# Patient Record
Sex: MALE | Race: WHITE | Employment: OTHER | URBAN - METROPOLITAN AREA
[De-identification: names, ages, dates, MRNs, and addresses within clinical notes are randomized per-mention and may not be internally consistent; named-entity substitution may affect disease eponyms.]

---

## 2017-08-28 ENCOUNTER — OFFICE VISIT (OUTPATIENT)
Dept: INTERNAL MEDICINE | Facility: CLINIC | Age: 32
End: 2017-08-28
Payer: COMMERCIAL

## 2017-08-28 VITALS
HEART RATE: 74 BPM | HEIGHT: 75 IN | BODY MASS INDEX: 39.17 KG/M2 | TEMPERATURE: 98 F | DIASTOLIC BLOOD PRESSURE: 76 MMHG | WEIGHT: 315 LBS | OXYGEN SATURATION: 98 % | SYSTOLIC BLOOD PRESSURE: 112 MMHG

## 2017-08-28 DIAGNOSIS — F32.9 MAJOR DEPRESSIVE DISORDER WITH SINGLE EPISODE, REMISSION STATUS UNSPECIFIED: ICD-10-CM

## 2017-08-28 DIAGNOSIS — Z01.818 PREOP GENERAL PHYSICAL EXAM: Primary | ICD-10-CM

## 2017-08-28 DIAGNOSIS — M25.562 LEFT KNEE PAIN, UNSPECIFIED CHRONICITY: ICD-10-CM

## 2017-08-28 DIAGNOSIS — F32.9 MAJOR DEPRESSIVE DISORDER, SINGLE EPISODE, UNSPECIFIED: ICD-10-CM

## 2017-08-28 LAB
GLUCOSE SERPL-MCNC: 91 MG/DL (ref 70–99)
HGB BLD-MCNC: 15.9 G/DL (ref 13.3–17.7)
PLATELET # BLD AUTO: 230 10E9/L (ref 150–450)
POTASSIUM SERPL-SCNC: 4.3 MMOL/L (ref 3.4–5.3)

## 2017-08-28 PROCEDURE — 82947 ASSAY GLUCOSE BLOOD QUANT: CPT | Performed by: INTERNAL MEDICINE

## 2017-08-28 PROCEDURE — 36415 COLL VENOUS BLD VENIPUNCTURE: CPT | Performed by: INTERNAL MEDICINE

## 2017-08-28 PROCEDURE — 84132 ASSAY OF SERUM POTASSIUM: CPT | Performed by: INTERNAL MEDICINE

## 2017-08-28 PROCEDURE — 85049 AUTOMATED PLATELET COUNT: CPT | Performed by: INTERNAL MEDICINE

## 2017-08-28 PROCEDURE — 85018 HEMOGLOBIN: CPT | Performed by: INTERNAL MEDICINE

## 2017-08-28 PROCEDURE — 99215 OFFICE O/P EST HI 40 MIN: CPT | Performed by: INTERNAL MEDICINE

## 2017-08-28 RX ORDER — CITALOPRAM HYDROBROMIDE 20 MG/1
20 TABLET ORAL DAILY
Qty: 90 TABLET | Refills: 3 | Status: SHIPPED | OUTPATIENT
Start: 2017-08-28 | End: 2018-06-07

## 2017-08-28 ASSESSMENT — ANXIETY QUESTIONNAIRES
5. BEING SO RESTLESS THAT IT IS HARD TO SIT STILL: NOT AT ALL
1. FEELING NERVOUS, ANXIOUS, OR ON EDGE: NOT AT ALL
GAD7 TOTAL SCORE: 0
IF YOU CHECKED OFF ANY PROBLEMS ON THIS QUESTIONNAIRE, HOW DIFFICULT HAVE THESE PROBLEMS MADE IT FOR YOU TO DO YOUR WORK, TAKE CARE OF THINGS AT HOME, OR GET ALONG WITH OTHER PEOPLE: NOT DIFFICULT AT ALL
3. WORRYING TOO MUCH ABOUT DIFFERENT THINGS: NOT AT ALL
2. NOT BEING ABLE TO STOP OR CONTROL WORRYING: NOT AT ALL
6. BECOMING EASILY ANNOYED OR IRRITABLE: NOT AT ALL
7. FEELING AFRAID AS IF SOMETHING AWFUL MIGHT HAPPEN: NOT AT ALL

## 2017-08-28 ASSESSMENT — PATIENT HEALTH QUESTIONNAIRE - PHQ9
SUM OF ALL RESPONSES TO PHQ QUESTIONS 1-9: 0
5. POOR APPETITE OR OVEREATING: NOT AT ALL

## 2017-08-28 NOTE — MR AVS SNAPSHOT
After Visit Summary   8/28/2017    Aguilar Renteria    MRN: [id number]           Patient Information     Date Of Birth          1985        Visit Information        Provider Department      8/28/2017 11:40 AM Jefferson Yanes MD [facility]        Today's Diagnoses     Preop general physical exam    -  1    Left knee pain, unspecified chronicity        Major depressive disorder with single episode, remission status unspecified        Body mass index 38.0-38.9, adult        Major depressive disorder, single episode, unspecified          Care Instructions      Before Your Surgery      Call your surgeon if there is any change in your health. This includes signs of a cold or flu (such as a sore throat, runny nose, cough, rash or fever).    Do not smoke, drink alcohol or take over the counter medicine (unless your surgeon or primary care doctor tells you to) for the 24 hours before and after surgery.    If you take prescribed drugs: Follow your doctor s orders about which medicines to take and which to stop until after surgery.    Eating and drinking prior to surgery: follow the instructions from your surgeon    Take a shower or bath the night before surgery. Use the soap your surgeon gave you to gently clean your skin. If you do not have soap from your surgeon, use your regular soap. Do not shave or scrub the surgery site.  Wear clean pajamas and have clean sheets on your bed.           Follow-ups after your visit        Who to contact     If you have questions or need follow up information about today's clinic visit or your schedule please contact [facility] directly at [phone].  Normal or non-critical lab and imaging results will be communicated to you by MyChart, letter or phone within 4 business days after the clinic has received the results. If you do not hear from us within 7 days, please contact the clinic through MyChart or phone. If you have a  "critical or abnormal lab result, we will notify you by phone as soon as possible.  Submit refill requests through [employer] or call your pharmacy and they will forward the refill request to us. Please allow 3 business days for your refill to be completed.          Additional Information About Your Visit        Be[employer] Information     [employer] gives you secure access to your electronic health record. If you see a primary care provider, you can also send messages to your care team and make appointments. If you have questions, please call your primary care clinic.  If you do not have a primary care provider, please call [phone] and they will assist you.        Care EveryWhere ID     This is your Care EveryWhere ID. This could be used by other organizations to access your [facility] medical records  [id number]        Your Vitals Were     Pulse Temperature Height Pulse Oximetry BMI (Body Mass Index)       74 98  F (36.7  C) (Oral) 6' 3\" (1.905 m) 98% 41.25 kg/m2        Blood Pressure from Last 3 Encounters:   08/28/17 112/76   12/28/16 116/60   09/01/16 122/84    Weight from Last 3 Encounters:   08/28/17 (!) 330 lb (149.7 kg)   09/01/16 (!) 316 lb 1 oz (143.4 kg)   05/26/15 (!) 310 lb 4.8 oz (140.8 kg)              We Performed the Following     DEPRESSION ACTION PLAN (DAP)     Glucose     Hemoglobin     Platelet count     Potassium          Where to get your medicines      These medications were sent to [facility] [address] [address]     Phone:  [phone]     citalopram 20 MG tablet          Primary Care Provider Office Phone # Fax #    Jefferson Yanes [phone]       [address] [place] [phone]        Equal Access to Services     DARYN BOWDEN : Lazaro quiroso Sojignesh, waaxda luqadaha, qaybta kaalmada adeegyataiwo, shayna peterson. So [facility] [phone].    ATENCIÓN: Si habla " español, tiene a forrest disposición servicios gratuitos de asistencia lingüística. Michael vazquez [phone].    We comply with applicable federal civil rights laws and [place] laws. We do not discriminate on the basis of race, color, national origin, age, disability sex, sexual orientation or gender identity.            Thank you!     Thank you for choosing [facility]  for your care. Our goal is always to provide you with excellent care. Hearing back from our patients is one way we can continue to improve our services. Please take a few minutes to complete the written survey that you may receive in the mail after your visit with us. Thank you!             Your Updated Medication List - Protect others around you: Learn how to safely use, store and throw away your medicines at www.disposemymeds.org.          This list is accurate as of: 8/28/17 12:03 PM.  Always use your most recent med list.                   Brand Name Dispense Instructions for use Diagnosis    citalopram 20 MG tablet    celeXA    90 tablet    Take 1 tablet (20 mg) by mouth daily    Major depressive disorder, single episode, unspecified

## 2017-08-28 NOTE — PROGRESS NOTES
[facility]  [address] [address]  [phone]  Dept: [phone]    PRE-OP EVALUATION:  Today's date: 2017    Aguilar Renteria (: 1985) presents for pre-operative evaluation assessment as requested by Dr. Hunter.  He requires evaluation and anesthesia risk assessment prior to undergoing surgery/procedure for treatment of left knee.  Proposed procedure: left knee arthroscopy    Date of Surgery/ Procedure: 17  Time of Surgery/ Procedure: 1:30 pm  Hospital/Surgical Facility: [facility]  Fax number for surgical facility: [phone]  Primary Physician: Jefferson Yanes  Type of Anesthesia Anticipated: to be determined    Patient has a Health Care Directive or Living Will:  NO      HPI:                                                      Brief HPI related to upcoming procedure: treatment of left knee.  Proposed procedure: left knee arthroscopy      Aguilar Renteria is a 31 year old male here for preoperative assessment for treatment of left knee.  Proposed procedure: left knee arthroscopy      See problem list for active medical problems.  Problems all longstanding and stable, except as noted/documented.  See ROS for pertinent symptoms related to these conditions.                                                                                                  .    MEDICAL HISTORY:                                                      Patient Active Problem List    Diagnosis Date Noted     Chronic bilateral low back pain without sciatica 2016     Priority: Medium     CARDIOVASCULAR SCREENING; LDL GOAL LESS THAN 160 2016     Priority: Medium     Body mass index 38.0-38.9, adult 2014     Priority: Medium     Anxiety state 2014     Priority: Medium     Problem list name updated by automated process. Provider to review       Major depressive disorder, single episode 2014     Priority: Medium     Problem list name updated  "by automated process. Provider to review        History reviewed. No pertinent past medical history.  No past surgical history on file.  Current Outpatient Prescriptions   Medication Sig Dispense Refill     ondansetron (ZOFRAN ODT) 4 MG ODT tab Take 1 tablet (4 mg) by mouth every 6 hours as needed for nausea 15 tablet 0     citalopram (CELEXA) 20 MG tablet Take 1 tablet (20 mg) by mouth daily 90 tablet 3     OTC products: None, except as noted above    No Known Allergies   Latex Allergy: NO    Social History   Substance Use Topics     Smoking status: Never Smoker     Smokeless tobacco: Never Used     Alcohol use Yes      Comment: occas     History   Drug Use No       REVIEW OF SYSTEMS:                                                    C: NEGATIVE for fever, chills, change in weight  E/M: NEGATIVE for ear, mouth and throat problems  R: NEGATIVE for significant cough or SOB  CV: NEGATIVE for chest pain, palpitations or peripheral edema  GI: NEGATIVE for nausea, abdominal pain, heartburn, or change in bowel habits  : NEGATIVE for frequency, dysuria, or hematuria  M: NEGATIVE for significant arthralgias or myalgia  H: NEGATIVE for bleeding problems  P: NEGATIVE for changes in mood or affect    EXAM:                                                    /76  Pulse 74  Temp 98  F (36.7  C) (Oral)  Ht 6' 3\" (1.905 m)  Wt (!) 330 lb (149.7 kg)  SpO2 98%  BMI 41.25 kg/m2    GENERAL APPEARANCE: healthy, alert and no distress     EYES: EOMI,  PERRL     HENT: ear canals and TM's normal and nose and mouth without ulcers or lesions     NECK: no adenopathy, no asymmetry, masses, or scars and thyroid normal to palpation     RESP: lungs clear to auscultation - no rales, rhonchi or wheezes     CV: regular rates and rhythm, normal S1 S2, no S3 or S4 and no murmur, click or rub     ABDOMEN:  soft, nontender, no HSM or masses and bowel sounds normal     MS: extremities normal- no gross deformities noted     NEURO: No " changes     PSYCH: mentation appears normal. and affect normal/bright    DIAGNOSTICS:                                                    No  EKG required for low risk surgery (cataract, skin procedure, breast biopsy, etc)    Recent Labs   Lab Test  12/28/16   0715   HGB  16.3   PLT  201   NA  141   POTASSIUM  4.1   CR  1.10        IMPRESSION:                                                    Diagnosis/reason for consult: knee pain    The proposed surgical procedure is considered LOW risk.    REVISED CARDIAC RISK INDEX  The patient has the following serious cardiovascular risks for perioperative complications such as (MI, PE, VFib and 3  AV Block):  No serious cardiac risks  INTERPRETATION: 1 risks: Class II (low risk - 0.9% complication rate)    The patient has the following additional risks for perioperative complications:  No identified additional risks      ICD-10-CM    1. Preop general physical exam Z01.818 Potassium     Glucose     Hemoglobin     Platelet count   2. Left knee pain, unspecified chronicity M25.562    3. Major depressive disorder with single episode, remission status unspecified F32.9    4. Body mass index 38.0-38.9, adult Z68.38    5. Major depressive disorder, single episode, unspecified F32.9 citalopram (CELEXA) 20 MG tablet       RECOMMENDATIONS:                                                      --Consult hospital rounder / IM to assist post-op medical management    Cardiovascular Risk  Performs 4 METs exercise without symptoms (Light housework (dusting, washing dishes), Climb a flight of stairs and Walk on level ground at 15 minutes per mile (4 miles/hour)) .     --Patient is to take all scheduled medications on the day of surgery EXCEPT for modifications listed below.    APPROVAL GIVEN to proceed with proposed procedure, without further diagnostic evaluation     Stop all NSAIDS prior to procedure.    Signed Electronically by: Jefferson Yanes MD    Copy of this evaluation report is  provided to requesting physician, Dr. Dale Luke Preop Guidelines

## 2017-08-28 NOTE — NURSING NOTE
"Chief Complaint   Patient presents with     Pre-Op Exam       Initial /76  Pulse 74  Temp 98  F (36.7  C) (Oral)  Ht 6' 3\" (1.905 m)  Wt (!) 330 lb (149.7 kg)  SpO2 98%  BMI 41.25 kg/m2 Estimated body mass index is 41.25 kg/(m^2) as calculated from the following:    Height as of this encounter: 6' 3\" (1.905 m).    Weight as of this encounter: 330 lb (149.7 kg).  Medication Reconciliation: complete   Hilaria Washburn CMA      "

## 2017-08-28 NOTE — LETTER
My Depression Action Plan  Name: Aguilar Renteria   Date of Birth 1985  Date: 8/28/2017    My doctor: Jefferson Yanes   My clinic: [address] [address] [phone]          GREEN    ZONE   Good Control    What it looks like:     Things are going generally well. You have normal up s and down s. You may even feel depressed from time to time, but bad moods usually last less than a day.   What you need to do:  1. Continue to care for yourself (see self care plan)  2. Check your depression survival kit and update it as needed  3. Follow your physician s recommendations including any medication.  4. Do not stop taking medication unless you consult with your physician first.           YELLOW         ZONE Getting Worse    What it looks like:     Depression is starting to interfere with your life.     It may be hard to get out of bed; you may be starting to isolate yourself from others.    Symptoms of depression are starting to last most all day and this has happened for several days.     You may have suicidal thoughts but they are not constant.   What you need to do:     1. Call your care team, your response to treatment will improve if you keep your care team informed of your progress. Yellow periods are signs an adjustment may need to be made.     2. Continue your self-care, even if you have to fake it!    3. Talk to someone in your support network    4. Open up your depression survival kit           RED    ZONE Medical Alert - Get Help    What it looks like:     Depression is seriously interfering with your life.     You may experience these or other symptoms: You can t get out of bed most days, can t work or engage in other necessary activities, you have trouble taking care of basic hygiene, or basic responsibilities, thoughts of suicide or death that will not go away, self-injurious behavior.     What you need to do:  1. Call your care team  and request a same-day appointment. If they are not available (weekends or after hours) call your local crisis line, emergency room or 911.      Electronically signed by: Hilaria Washburn, August 28, 2017    Depression Self Care Plan / Survival Kit    Self-Care for Depression  Here s the deal. Your body and mind are really not as separate as most people think.  What you do and think affects how you feel and how you feel influences what you do and think. This means if you do things that people who feel good do, it will help you feel better.  Sometimes this is all it takes.  There is also a place for medication and therapy depending on how severe your depression is, so be sure to consult with your medical provider and/ or Behavioral Health Consultant if your symptoms are worsening or not improving.     In order to better manage my stress, I will:    Exercise  Get some form of exercise, every day. This will help reduce pain and release endorphins, the  feel good  chemicals in your brain. This is almost as good as taking antidepressants!  This is not the same as joining a gym and then never going! (they count on that by the way ) It can be as simple as just going for a walk or doing some gardening, anything that will get you moving.      Hygiene   Maintain good hygiene (Get out of bed in the morning, Make your bed, Brush your teeth, Take a shower, and Get dressed like you were going to work, even if you are unemployed).  If your clothes don't fit try to get ones that do.    Diet  I will strive to eat foods that are good for me, drink plenty of water, and avoid excessive sugar, caffeine, alcohol, and other mood-altering substances.  Some foods that are helpful in depression are: complex carbohydrates, B vitamins, flaxseed, fish or fish oil, fresh fruits and vegetables.    Psychotherapy  I agree to participate in Individual Therapy (if recommended).    Medication  If prescribed medications, I agree to take them.  Missing  doses can result in serious side effects.  I understand that drinking alcohol, or other illicit drug use, may cause potential side effects.  I will not stop my medication abruptly without first discussing it with my provider.    Staying Connected With Others  I will stay in touch with my friends, family members, and my primary care provider/team.    Use your imagination  Be creative.  We all have a creative side; it doesn t matter if it s oil painting, sand castles, or mud pies! This will also kick up the endorphins.    Witness Beauty  (AKA stop and smell the roses) Take a look outside, even in mid-winter. Notice colors, textures. Watch the squirrels and birds.     Service to others  Be of service to others.  There is always someone else in need.  By helping others we can  get out of ourselves  and remember the really important things.  This also provides opportunities for practicing all the other parts of the program.    Humor  Laugh and be silly!  Adjust your TV habits for less news and crime-drama and more comedy.    Control your stress  Try breathing deep, massage therapy, biofeedback, and meditation. Find time to relax each day.     My support system    Clinic Contact:  Phone number:    Contact 1:  Phone number:    Contact 2:  Phone number:    Rastafarian/:  Phone number:    Therapist:  Phone number:    Local crisis center:    Phone number:    Other community support:  Phone number:

## 2017-08-29 ASSESSMENT — ANXIETY QUESTIONNAIRES: GAD7 TOTAL SCORE: 0

## 2017-09-19 ENCOUNTER — VIRTUAL VISIT (OUTPATIENT)
Dept: FAMILY MEDICINE | Facility: OTHER | Age: 32
End: 2017-09-19

## 2017-09-19 NOTE — PROGRESS NOTES
"Date:   Clinician: Arya Aguilar  Clinician NPI: [id number]  Patient: Aguilar Renteria  Patient : 1985  Patient Address: [address] [place] [address], [place] [address]  Patient Phone: [phone]  Visit Protocol: URI  Patient Summary:  Aguilar is a 31 year old ( : 1985 ) male who initiated a Visit for cold, sinus infection, or influenza. When asked the question \"Please sign me up to receive news, health information and promotions from [employer].\", Aguilar responded \"No\".    Aguilar states his symptoms started gradually 3-6 days ago.   His symptoms consist of malaise, rhinitis, facial pain or pressure, a cough, a sore throat, myalgia, and nasal congestion.   Symptom Details     Nasal secretions: The color of his mucus is green.    Cough: Aguilar coughs every 5-10 minutes and his cough is more bothersome at night. Phlegm comes into his throat when he coughs. He believes the phlegm causes the cough. The color of the phlegm is green.     Sore throat: Aguilar reports having moderate throat pain, does not have exudate on his tonsils, and is able to swallow liquids. The lymph nodes in his neck are not enlarged. He states that rashes have not appeared on the skin since the sore throat started.     Facial pain or pressure: The facial pain or pressure feels worse when bending over or leaning forward.      Aguilar denies having fever, dyspnea, ear pain, headache, teeth pain, chills, enlarged lymph nodes, and wheezing. He also denies having recent facial or sinus surgery in the past 60 days, taking antibiotic medication for the symptoms, and double sickening.   Within the past week, Aguilar has not been exposed to someone with strep throat. He has not recently been exposed to someone with influenza. Aguilar has not been in close contact with any high risk individuals.   Weight: 330 lbs   Aguilar does not smoke or use smokeless tobacco.   MEDICATIONS:  Citalopram (Celexa)  , ALLERGIES:  NKDA   Clinician Response:  Dear Aguilar,  Based on " the information you have provided, you likely have viral sinusitis  commonly called a head cold.  I am prescribing fluticasone propionate nasal (Flonase) 50 mcg/spray. Take one or two inhalations in each nostril one time a day. There are no refills with this prescription.   Both viruses and bacteria cause sinus infections, but only bacterial infections will improve with antibiotics. Bacterial sinus infections usually do not develop until after you have been sick for 7 to 10 days. Viral infections, which are far more common, are often starting to improve on their own by that time.  Because you have had symptoms for less than 10 days, you likely have a viral infection, so antibiotics are not recommended. If you continue to have symptoms of sinus pain and pressure for more than 10 days, please consider doing another Visit.  You will feel better faster if you take care of yourself by getting more rest and drinking plenty of liquids, especially water.  Remember to wash your hands often and stay home while you are sick to decrease the chance you will spread your infection to others.  Try the following to help with your throat pain and discomfort:     Use throat lozenges    Gargle with warm salt water (1/4 teaspoon of salt per 8 ounce glass of water)    Suck on frozen items such as popsicles or ice cubes     Call 911 or go to the emergency room if you feel that your throat is closing off, you suddenly develop a rash, you are unable to swallow fluids, you are drooling, or you are having difficulty breathing.   Diagnosis: Viral sinusitis  Diagnosis ICD: J01.90  Prescription: fluticasone propionate (Flonase) 50mcg nasal spray 16 gm, 30 days supply. Take one or two inhalations in each nostril one time a day. Refills: 0, Refill as needed: no, Allow substitutions: yes  Pharmacy: [facility] - [phone] - [address] [address]

## 2017-10-12 ENCOUNTER — VIRTUAL VISIT (OUTPATIENT)
Dept: FAMILY MEDICINE | Facility: OTHER | Age: 32
End: 2017-10-12

## 2017-10-12 NOTE — PROGRESS NOTES
"Date:   Clinician: Beatrice Giron  Clinician NPI: [id number]  Patient: Aguilar Renteria  Patient : 1985  Patient Address: [address] [place] [address], [place] [address]  Patient Phone: [phone]  Visit Protocol: URI  Patient Summary:  Aguilar is a 31 year old ( : 1985 ) male who initiated a Visit for cold, sinus infection, or influenza. When asked the question \"Please sign me up to receive news, health information and promotions from [employer].\", Aguilar responded \"No\".    Aguilar states his symptoms started 1-2 days ago.   His symptoms consist of nasal congestion, malaise, myalgia, a sore throat, rhinitis, facial pain or pressure, a headache, a cough, and chills. Aguilar also feels feverish.   Symptom Details     Nasal secretions: The color of his mucus is clear.    Cough: Aguilar coughs every 5-10 minutes and his cough is not more bothersome at night. Phlegm comes into his throat when he coughs. He does not believe the phlegm causes the cough. The color of the phlegm is clear.     Sore throat: Aguilar reports having mild throat pain, does not have exudate on his tonsils, and is able to swallow liquids. The lymph nodes in his neck He is not sure if the lymph nodes in his neck are enlarged. He states that rashes have not appeared on the skin since the sore throat started.     Temperature: His current temperature is 100 degrees Fahrenheit.     Facial pain or pressure: The facial pain or pressure does not feel worse when bending or leaning forward.     Headache: He states the headache is moderate.      Aguilar denies having wheezing, dyspnea, ear pain, and teeth pain. He also denies taking antibiotic medication for the symptoms and having recent facial or sinus surgery in the past 60 days.   Aguilar is not sure if he has been exposed to someone with strep throat. He has not recently been exposed to someone with influenza. Aguilar has been in close contact with the following high risk individuals: children under the age " of 5.   Weight: 330 lbs   Aguilar does not smoke or use smokeless tobacco.   MEDICATIONS:  Citalopram (Celexa)  , ALLERGIES:  NKDA   Clinician Response:  Dear Aguilar,  Based on the information you have provided, you likely have a viral upper respiratory infection, otherwise known as a 'cold'.  I am prescribing benzonatate (Tessalon Perles) 100 mg to treat your cough. Take one to two tablets by mouth three times a day as needed for cough. There are no refills with this prescription.   Unless your are allergic to the over-the-counter medication(s) below, I recommend using:   Dextromethorphan (Robitussin DM or store brand). This medication is an expectorant that works by thinning the mucus in your air passages to make it easier to cough up the mucus and clear your airways. Adults and children over 12 years old, take two teaspoons by mouth every 4 hours as needed. This is an over-the-counter medication that does not require a prescription.   An antihistamine like Benadryl, Claritin or store brand. This is an over-the-counter medication that does not require a prescription.   A decongestant such as pseudoephedrine (Sudafed or store brand) to help your symptoms. This is an over-the-counter medication that does not require a prescription.   Acetaminophen (Tylenol), which helps to reduce your discomfort and fever. Take 1-2 pills every 4-6 hours. This is an over-the-counter medication that does not require a prescription.   Ibuprofen. Take 1-3 tablets (200-600mg) every 8 hours to help with the discomfort. Make sure to take the ibuprofen with food. Do not exceed 2400mg in 24 hours. This is an over-the-counter medication that does not require a prescription.   You will feel better faster if you take care of yourself by getting more rest and drinking plenty of liquids, especially water.  Remember to wash your hands often and stay home while you are sick to decrease the chance you will spread your infection to others.  Try the  following to help with your throat pain and discomfort:     Use throat lozenges    Gargle with warm salt water (1/4 teaspoon of salt per 8 ounce glass of water)    Suck on frozen items such as popsicles or ice cubes     Call 911 or go to the emergency room if you feel that your throat is closing off, you suddenly develop a rash, you are unable to swallow fluids, you are drooling, or you are having difficulty breathing.  Follow up with your primary care provider if your symptoms are not improving in 3-4 days.   Diagnosis: Viral URI  Diagnosis ICD: J06.9  Additional Clinician Notes: Please be seen in the clinic if your symptoms worsen or fail to improve in the next 7-10 days.  Prescription: benzonatate (Tessalon Perles) 100 mg oral capsule 30 capsules, 5 days supply. Take one to two capsules by mouth three times a day as needed. Refills: 0, Refill as needed: no, Allow substitutions: yes  Pharmacy: [facility] - [phone] - [address] [address]

## 2018-06-07 ENCOUNTER — OFFICE VISIT (OUTPATIENT)
Dept: INTERNAL MEDICINE | Facility: CLINIC | Age: 33
End: 2018-06-07
Payer: COMMERCIAL

## 2018-06-07 VITALS
HEIGHT: 75 IN | RESPIRATION RATE: 16 BRPM | DIASTOLIC BLOOD PRESSURE: 84 MMHG | WEIGHT: 315 LBS | SYSTOLIC BLOOD PRESSURE: 134 MMHG | HEART RATE: 81 BPM | TEMPERATURE: 98 F | BODY MASS INDEX: 39.17 KG/M2

## 2018-06-07 DIAGNOSIS — F32.9 MAJOR DEPRESSIVE DISORDER WITH SINGLE EPISODE, REMISSION STATUS UNSPECIFIED: Primary | ICD-10-CM

## 2018-06-07 LAB — TSH SERPL DL<=0.005 MIU/L-ACNC: 0.81 MU/L (ref 0.4–4)

## 2018-06-07 PROCEDURE — 99213 OFFICE O/P EST LOW 20 MIN: CPT | Performed by: INTERNAL MEDICINE

## 2018-06-07 PROCEDURE — 36415 COLL VENOUS BLD VENIPUNCTURE: CPT | Performed by: INTERNAL MEDICINE

## 2018-06-07 PROCEDURE — 84443 ASSAY THYROID STIM HORMONE: CPT | Performed by: INTERNAL MEDICINE

## 2018-06-07 RX ORDER — CETIRIZINE HYDROCHLORIDE 10 MG/1
10 TABLET ORAL DAILY
COMMUNITY

## 2018-06-07 ASSESSMENT — PAIN SCALES - GENERAL: PAINLEVEL: NO PAIN (0)

## 2018-06-07 NOTE — PROGRESS NOTES
SUBJECTIVE:   Aguilar Renteria is a 32 year old male who presents to clinic today for the following health issues:    Patient requesting thyroid check- has been doing weight watchers since April 2018 and has not had any weight loss. Patients mother has hx of thyroid problems. Patient d/c Citalopram around April and feels well off the medication.     Problem list and histories reviewed & adjusted, as indicated.  Additional history: as documented    Patient Active Problem List   Diagnosis     Anxiety state     Body mass index 38.0-38.9, adult     Chronic bilateral low back pain without sciatica     CARDIOVASCULAR SCREENING; LDL GOAL LESS THAN 160     Major depressive disorder with single episode, remission status unspecified     Past Surgical History:   Procedure Laterality Date     ORTHOPEDIC SURGERY  09/01/2017    Knee othroscopy       Social History   Substance Use Topics     Smoking status: Never Smoker     Smokeless tobacco: Never Used     Alcohol use Yes      Comment: occas     Family History   Problem Relation Age of Onset     Depression Mother      Thyroid Disease Mother      Obesity Mother      Anxiety Disorder Father      HEART DISEASE Maternal Grandfather      Breast Cancer Paternal Grandmother      Breast Cancer Other      Father's Sister         Current Outpatient Prescriptions   Medication Sig Dispense Refill     cetirizine (ZYRTEC) 10 MG tablet Take 10 mg by mouth daily       citalopram (CELEXA) 20 MG tablet Take 1 tablet (20 mg) by mouth daily 90 tablet 3     BP Readings from Last 3 Encounters:   06/07/18 134/84   08/28/17 112/76   12/28/16 116/60    Wt Readings from Last 3 Encounters:   06/07/18 (!) 341 lb 12.8 oz (155 kg)   08/28/17 (!) 330 lb (149.7 kg)   09/01/16 (!) 316 lb 1 oz (143.4 kg)           Reviewed and updated as needed this visit by clinical staff  Tobacco  Allergies  Meds  Med Hx  Surg Hx  Fam Hx  Soc Hx      Reviewed and updated as needed this visit by Provider      "  ROS:  CONSTITUTIONAL: NEGATIVE for fever, chills  ENT/MOUTH: NEGATIVE for ear, mouth and throat problems  RESP: NEGATIVE for significant cough or SOB  CV: NEGATIVE for chest pain, palpitations or peripheral edema  GI: NEGATIVE for nausea, abdominal pain, heartburn, or change in bowel habits  : NEGATIVE for frequency, dysuria, or hematuria  MUSCULOSKELETAL: NEGATIVE for significant arthralgias or myalgia  HEME: NEGATIVE for bleeding problems  PSYCHIATRIC: NEGATIVE for changes in mood or affect    OBJECTIVE:                                                    /84  Pulse 81  Temp 98  F (36.7  C) (Oral)  Resp 16  Ht 6' 3\" (1.905 m)  Wt (!) 341 lb 12.8 oz (155 kg)  BMI 42.72 kg/m2  Body mass index is 42.72 kg/(m^2).  GENERAL: alert and no distress  obese  PSYCH: Alert and oriented times 3; speech- coherent , normal rate and volume; able to articulate logical thoughts, able to abstract reason, no tangential thoughts, no hallucinations or delusions, affect- normal     ASSESSMENT/PLAN:                                                      (F32.9) Major depressive disorder with single episode, remission status unspecified  (primary encounter diagnosis)  Comment: Stable off therapy with PHQ 9 score of 0  Plan: TSH with free T4 reflex            (Z68.41) Body mass index (BMI) of 40.0-44.9 in adult (H)  Comment: Discussed a weight reduction in the setting of Weight Watchers point system, exercise and ideal body weight.  Plan: Vice patient to avoid excessive weight lifting due to risk of increasing muscle bulk and weight overall.  Exercise was emphasized.      See Patient Instructions    Jefferson Yanes MD  [facility]    THE MEDICATION LIST HAS BEEN FULLY RECONCILED BY THE M.D. AND THE NURSING STAFF.    "

## 2018-06-07 NOTE — LETTER
[facility]  [address] [address]  [phone]      6/8/2018       Aguilar Renteria  [address] [facility] [address]        Dear Aguilar,    Your thyroid function tests look good and thus I would not change anything at this point.    Sincerely,      Jefferson Yanes MD  Internal Medicine

## 2018-06-07 NOTE — MR AVS SNAPSHOT
After Visit Summary   6/7/2018    Aguilar Renteria    MRN: [id number]           Patient Information     Date Of Birth          1985        Visit Information        Provider Department      6/7/2018 2:20 PM Jefferson Yanes MD [facility]        Today's Diagnoses     Major depressive disorder with single episode, remission status unspecified    -  1    Body mass index (BMI) of 40.0-44.9 in adult (H)           Follow-ups after your visit        Follow-up notes from your care team     Return if symptoms worsen or fail to improve.      Who to contact     If you have questions or need follow up information about today's clinic visit or your schedule please contact [facility] directly at [phone].  Normal or non-critical lab and imaging results will be communicated to you by MyChart, letter or phone within 4 business days after the clinic has received the results. If you do not hear from us within 7 days, please contact the clinic through [email or web address] or phone. If you have a critical or abnormal lab result, we will notify you by phone as soon as possible.  Submit refill requests through [employer] or call your pharmacy and they will forward the refill request to us. Please allow 3 business days for your refill to be completed.          Additional Information About Your Visit        MyChart Information     [employer] gives you secure access to your electronic health record. If you see a primary care provider, you can also send messages to your care team and make appointments. If you have questions, please call your primary care clinic.  If you do not have a primary care provider, please call [phone] and they will assist you.        Care EveryWhere ID     This is your Care EveryWhere ID. This could be used by other organizations to access your [place] medical records  [id number]        Your Vitals Were     Pulse Temperature Respirations Height BMI (Body Mass  "Index)       81 98  F (36.7  C) (Oral) 16 6' 3\" (1.905 m) 42.72 kg/m2        Blood Pressure from Last 3 Encounters:   06/07/18 134/84   08/28/17 112/76   12/28/16 116/60    Weight from Last 3 Encounters:   06/07/18 (!) 341 lb 12.8 oz (155 kg)   08/28/17 (!) 330 lb (149.7 kg)   09/01/16 (!) 316 lb 1 oz (143.4 kg)              We Performed the Following     TSH with free T4 reflex        Primary Care Provider Office Phone # Fax #    Jefferson Yanes [phone]       [address] [facility] [address]        Equal Access to Services     DARYN BOWDEN : Hadii lilo conway hadasho Soomaali, waaxda luqadaha, qaybta kaalmada adeegyada, shayna liu haylida king . So [facility] [phone].    ATENCIÓN: Si habla español, tiene a forrest disposición servicios gratuitos de asistencia lingüística. Llame al [phone].    We comply with applicable federal civil rights laws and [place] laws. We do not discriminate on the basis of race, color, national origin, age, disability, sex, sexual orientation, or gender identity.            Thank you!     Thank you for choosing [facility]  for your care. Our goal is always to provide you with excellent care. Hearing back from our patients is one way we can continue to improve our services. Please take a few minutes to complete the written survey that you may receive in the mail after your visit with us. Thank you!             Your Updated Medication List - Protect others around you: Learn how to safely use, store and throw away your medicines at www.disposemymeds.org.          This list is accurate as of 6/7/18  2:39 PM.  Always use your most recent med list.                   Brand Name Dispense Instructions for use Diagnosis    cetirizine 10 MG tablet    zyrTEC     Take 10 mg by mouth daily          "

## 2018-06-08 ASSESSMENT — PATIENT HEALTH QUESTIONNAIRE - PHQ9: SUM OF ALL RESPONSES TO PHQ QUESTIONS 1-9: 0

## 2018-07-31 ENCOUNTER — HOSPITAL ENCOUNTER (OUTPATIENT)
Dept: LAB | Facility: CLINIC | Age: 33
Discharge: HOME | End: 2018-07-31
Attending: PHYSICIAN ASSISTANT | Admitting: PHYSICIAN ASSISTANT
Payer: COMMERCIAL

## 2018-07-31 ENCOUNTER — OFFICE VISIT (OUTPATIENT)
Dept: SURGERY | Facility: CLINIC | Age: 33
End: 2018-07-31
Payer: COMMERCIAL

## 2018-07-31 VITALS
SYSTOLIC BLOOD PRESSURE: 128 MMHG | HEART RATE: 82 BPM | RESPIRATION RATE: 12 BRPM | HEIGHT: 75 IN | TEMPERATURE: 98 F | BODY MASS INDEX: 39.17 KG/M2 | OXYGEN SATURATION: 97 % | WEIGHT: 315 LBS | DIASTOLIC BLOOD PRESSURE: 83 MMHG

## 2018-07-31 VITALS — BODY MASS INDEX: 39.17 KG/M2 | HEIGHT: 75 IN | WEIGHT: 315 LBS

## 2018-07-31 DIAGNOSIS — Z13.29 SCREENING FOR ENDOCRINE, NUTRITIONAL, METABOLIC AND IMMUNITY DISORDER: ICD-10-CM

## 2018-07-31 DIAGNOSIS — Z13.228 SCREENING FOR ENDOCRINE, NUTRITIONAL, METABOLIC AND IMMUNITY DISORDER: ICD-10-CM

## 2018-07-31 DIAGNOSIS — E66.01 MORBID OBESITY (H): ICD-10-CM

## 2018-07-31 DIAGNOSIS — E66.01 MORBID OBESITY (H): Primary | ICD-10-CM

## 2018-07-31 DIAGNOSIS — Z13.21 SCREENING FOR ENDOCRINE, NUTRITIONAL, METABOLIC AND IMMUNITY DISORDER: ICD-10-CM

## 2018-07-31 DIAGNOSIS — G47.33 OSA ON CPAP: ICD-10-CM

## 2018-07-31 DIAGNOSIS — Z13.0 SCREENING FOR ENDOCRINE, NUTRITIONAL, METABOLIC AND IMMUNITY DISORDER: ICD-10-CM

## 2018-07-31 DIAGNOSIS — Z13.0 SCREENING FOR IRON DEFICIENCY ANEMIA: ICD-10-CM

## 2018-07-31 DIAGNOSIS — R73.03 PREDIABETES: ICD-10-CM

## 2018-07-31 DIAGNOSIS — G89.29 CHRONIC BILATERAL LOW BACK PAIN WITHOUT SCIATICA: ICD-10-CM

## 2018-07-31 DIAGNOSIS — M54.50 CHRONIC BILATERAL LOW BACK PAIN WITHOUT SCIATICA: ICD-10-CM

## 2018-07-31 LAB
ALBUMIN SERPL-MCNC: 3.6 G/DL (ref 3.4–5)
ALP SERPL-CCNC: 74 U/L (ref 40–150)
ALT SERPL W P-5'-P-CCNC: 44 U/L (ref 0–70)
ANION GAP SERPL CALCULATED.3IONS-SCNC: 4 MMOL/L (ref 3–14)
AST SERPL W P-5'-P-CCNC: 25 U/L (ref 0–45)
BILIRUB SERPL-MCNC: 0.9 MG/DL (ref 0.2–1.3)
BUN SERPL-MCNC: 14 MG/DL (ref 7–30)
CALCIUM SERPL-MCNC: 9.1 MG/DL (ref 8.5–10.1)
CHLORIDE SERPL-SCNC: 107 MMOL/L (ref 94–109)
CO2 SERPL-SCNC: 31 MMOL/L (ref 20–32)
CREAT SERPL-MCNC: 1.36 MG/DL (ref 0.66–1.25)
DEPRECATED CALCIDIOL+CALCIFEROL SERPL-MC: 29 UG/L (ref 20–75)
ERYTHROCYTE [DISTWIDTH] IN BLOOD BY AUTOMATED COUNT: 12.7 % (ref 10–15)
GFR SERPL CREATININE-BSD FRML MDRD: 61 ML/MIN/1.7M2
GLUCOSE SERPL-MCNC: 98 MG/DL (ref 70–99)
HBA1C MFR BLD: 5.8 % (ref 0–5.6)
HCT VFR BLD AUTO: 45.9 % (ref 40–53)
HGB BLD-MCNC: 15.5 G/DL (ref 13.3–17.7)
MCH RBC QN AUTO: 30.3 PG (ref 26.5–33)
MCHC RBC AUTO-ENTMCNC: 33.8 G/DL (ref 31.5–36.5)
MCV RBC AUTO: 90 FL (ref 78–100)
PLATELET # BLD AUTO: 208 10E9/L (ref 150–450)
POTASSIUM SERPL-SCNC: 3.7 MMOL/L (ref 3.4–5.3)
PROT SERPL-MCNC: 7.4 G/DL (ref 6.8–8.8)
RBC # BLD AUTO: 5.11 10E12/L (ref 4.4–5.9)
SODIUM SERPL-SCNC: 142 MMOL/L (ref 133–144)
WBC # BLD AUTO: 8.4 10E9/L (ref 4–11)

## 2018-07-31 PROCEDURE — 36415 COLL VENOUS BLD VENIPUNCTURE: CPT | Performed by: PHYSICIAN ASSISTANT

## 2018-07-31 PROCEDURE — 85027 COMPLETE CBC AUTOMATED: CPT | Performed by: PHYSICIAN ASSISTANT

## 2018-07-31 PROCEDURE — 80053 COMPREHEN METABOLIC PANEL: CPT | Performed by: PHYSICIAN ASSISTANT

## 2018-07-31 PROCEDURE — 82306 VITAMIN D 25 HYDROXY: CPT | Performed by: PHYSICIAN ASSISTANT

## 2018-07-31 PROCEDURE — 83036 HEMOGLOBIN GLYCOSYLATED A1C: CPT | Performed by: PHYSICIAN ASSISTANT

## 2018-07-31 PROCEDURE — 97802 MEDICAL NUTRITION INDIV IN: CPT | Performed by: DIETITIAN, REGISTERED

## 2018-07-31 PROCEDURE — 99205 OFFICE O/P NEW HI 60 MIN: CPT | Performed by: PHYSICIAN ASSISTANT

## 2018-07-31 NOTE — PROGRESS NOTES
"2018      New Bariatric Surgery Consultation Note    RE: Aguilar Renteria  MR#: [id number]  : 1985    Chief Complaint/Reason for visit: evaluation for possible weight loss surgery    Dear Jefferson Yanes MD (General),    I had the pleasure of seeing your patient, Aguilar Renteria, to evaluate his obesity and consider him for possible weight loss surgery. As you know, Aguilar Renteria is 32 year old.  He has a height of 6' 3\", a weight of 345 lbs 0 oz, and calculated Body mass index is 43.12 kg/(m^2).     HISTORY OF PRESENT ILLNESS:  Weight Loss History Reviewed with Patient 2018   How long have you been overweight? Since puberty   What is the most that you have ever weighed? 350   What is the most weight you have lost? 60   I have tried the following methods to lose weight Watching portions or calories, Exercise, Weight Watchers   I have tried the following weight loss medications? (Check all that apply) None   Have you ever had weight loss surgery? No       CO-MORBIDITIES OF OBESITY INCLUDE:     2018   I have the following co-morbidities associated with obesity: Sleep Apnea, Weight Bearing Joint Pain- low back pain   Do you use a CPAP? Yes       PAST MEDICAL HISTORY:  Past Medical History:   Diagnosis Date     Anxiety May 2014    Took Citalapram for anxiety, no longer on medication.     Major depressive disorder with single episode, remission status unspecified        PAST SURGICAL HISTORY: No previous bleeding, anesthesia, or nausea concerns with surgery.  Past Surgical History:   Procedure Laterality Date     ENT SURGERY      TM repair     ORTHOPEDIC SURGERY  2017    Knee othroscopy       FAMILY HISTORY:   Family History   Problem Relation Age of Onset     Depression Mother      Thyroid Disease Mother      Obesity- s/p lapband Mother      Anxiety Disorder Father      HEART DISEASE Maternal Grandfather      Breast Cancer Paternal Grandmother      Breast Cancer Other      Father's Sister "       SOCIAL HISTORY:   Social History Questions Reviewed With Patient 7/31/2018   Which best describes your employment status (select all that apply) I work full-time   If you work, what is your occupation?  for [employer]   Which best describes your marital status: -wife Esperanza   Do you have children? No   Who do you have in your support network that can be available to help you for the first 2 weeks after surgery? wife, parents   Who can you count on for support throughout your weight loss surgery journey? wife   Can you afford 3 meals a day?  Yes   Can you afford 50-60 dollars a month for vitamins? Yes   Enjoys travel. Recently returned from [facility].      HABITS:     7/31/2018   How often do you drink alcohol? 2-3 times a week   If you do drink alcohol, how many drinks might you have in a day? (one drink = 5 oz. wine, 1 can/bottle of beer, 1 shot liquor) 1 or 2   Do you currently use any of the following Nicotine products? No   Have you ever used any of the following nicotine products? No   Have you or are you currently using street drugs or prescription strength medication for which you do not have a prescription for? No   Do you have a history of chemical dependency (alcohol or drug abuse)? No       PSYCHOLOGICAL HISTORY:   Psychological History Reviewed With Patient 7/31/2018   Have you ever attempted suicide? Never.   Have you had thoughts of suicide in the past year? No   Have you ever been hospitalized for mental illness or a suicide attempt? Never.   Do you have a history of chronic pain? No   Have you ever been diagnosed with fibromyalgia? No   Are you currently being treated for any of the following? (select all that apply) I do not have a mental illness       ROS:     7/31/2018   Skin:  None of the above   HEENT: Dizziness upon standing occasionally   Musculoskeletal: Joint Pain, Back pain   Cardiovascular: Shortness of breath with activity   Pulmonary: Shortness of  "breath with activity, Snoring   Gastrointestinal: None of the above   Genitourinary: None of the above   Hematological: None of the above   Neurological: None of the above       EATING BEHAVIORS:     7/31/2018   Have you or anyone else thought that you had an eating disorder? No   Do you currently binge eat (eat a large amount of food in a short time)? No   Are you an emotional eater? Yes   Do you get up to eat after falling asleep? No       EXERCISE:     7/31/2018   How often do you exercise? Less than 1 time per week   What is the duration of your exercise (in minutes)? 30 Minutes   What types of exercise do you do? walking, gym membership, weightlifting   What keeps you from being more active?  Pain, I have just had surgery on one or more of my joints, Shortness of breath, Lack of Time, Too tired       MEDICATIONS:  Current Outpatient Prescriptions   Medication     cetirizine (ZYRTEC) 10 MG tablet     No current facility-administered medications for this visit.        ALLERGIES:  No Known Allergies    LABS/IMAGING/MEDICAL RECORDS REVIEW:   TSH   Date Value Ref Range Status   06/07/2018 0.81 0.40 - 4.00 mU/L Final       PHYSICAL EXAM:  /83 (BP Location: Right arm, Patient Position: Sitting, Cuff Size: Adult Large)  Pulse 82  Temp 98  F (36.7  C) (Oral)  Resp 12  Ht 6' 3\" (1.905 m)  Wt (!) 345 lb (156.5 kg)  SpO2 97%  BMI 43.12 kg/m2  GENERAL:  Good development and normal affect in no acute distress. Patient is alert and orientated x 3 and answers all questions appropriately.  HEENT: Head is normocephalic, nontraumatic. Pupils equal and round without conjunctival injection. Neck is supple without lymphadenopathy, thyroidmegaly, or mass. Trachea midline. Dentition WNL.   CARDIOVASCULAR:  Regular rate and rhythm without murmurs, rubs, or gallops.  RESPIRATORY: Lungs are clear to auscultation bilaterally with good breath sounds.  GASTROINTESTINAL: Abdomen is obese, nondistended, soft, nontender, without " organomegaly or masses. No bruits.  LOWER EXTREMITIES: No LE edema bilaterally, no cyanosis, ulceration, or chronic venous stasis noted.  MUSCULOSKELETAL:  Moves all 4 extremities equal and strong. Has a normal gait.   NEUROLOGIC: Cranial nerves II-XII grossly intact.  SKIN: No intertriginous irritation or rash.     In summary, Aguilar Renteria has Class III obesity with a body mass index of Body mass index is 43.12 kg/(m^2). kg/m2 and the comorbidities stated above. He completed an informational seminar and is a candidate for the laparoscopic gastric sleeve.  He will have to complete the following pre-requisites:    Lose 5 lbs prior to surgery.  Goal Weight: 340 lbs  Have preoperative laboratory tests drawn.   Psychological Evaluation with MMPI and clearance for weight loss surgery.  Achieve clearance from dietitian to see surgeon.  Today in the office we discussed gastric bypass and gastric sleeve surgery. Preoperative, perioperative, and postoperative processes, management, and follow up were addressed.  Risks and benefits were outlined including the risk of death, staple line leak (1-2%), PE, DVT, ulcer, worsening GERD with gastric sleeve surgery, N/V, stricture, hernia, wound infection, weight regain, and vitamin deficiencies. I emphasized exercise and activity along with appropriate food choice as the main foundation for weight loss with surgery providing surgical reinforcement of this. Instruction provided on the importance of avoiding NSAIDS and its impact on ulcer formation and/or perforation following bariatric surgery.   All questions were answered.  A goal sheet and support group handout were given to the patient.     Once the patient has completed the requirements in their task list and there are no further recommendations, the pt will be allowed to see the surgeon of her choice for consultation on the laparoscopic gastric sleeve or gastric bypass surgery. Patient verbalizes understanding of the process to  surgery and expectations for the postoperative period including the need for lifelong lifestyle changes, vitamin supplementation, and laboratory monitoring.     Sincerely,    Daniela Hightower PA-C    I spent a total of 70 minutes face to face with Aguilar during today's office visit. Over 50% of this time was spent counseling the patient and/or coordinating care.

## 2018-07-31 NOTE — PROGRESS NOTES
"New Bariatric Nutrition Consultation Note    Reason For Visit: Nutrition Assessment    Aguilar Renteria is a 32 year old presenting today for new bariatric nutrition consult.  Pt is interested in laparoscopic (undecided) Patient is accompanied by wife.    Support System Reviewed With Patient 7/31/2018   Who do you have in your support network that can be available to help you for the first 2 weeks after surgery? wife, parents   Who can you count on for support throughout your weight loss surgery journey? wife       ANTHROPOMETRICS:  Estimated body mass index is 43.12 kg/(m^2) as calculated from the following:    Height as of this encounter: 1.905 m (6' 3\").    Weight as of this encounter: 156.5 kg (345 lb).    Required weight loss goal pre-op: 5 lbs from initial consult weight (goal weight 340 lbs or less before surgery)       7/31/2018   I have tried the following methods to lose weight Watching portions or calories, Exercise, Weight Watchers       Weight Loss Questions Reviewed With Patient 7/31/2018   How long have you been overweight? Since puberty         NUTRITION HISTORY:  Recall Diet Questions Reviewed With Patient 7/31/2018   Describe what you typically consume for breakfast (typical or most recent): eggs, coffee, sausage, toast   Describe what you typically consume for lunch (typical or most recent): frozen lean cuisine   Describe what you typically consume for supper (typical or most recent): chicken and rice, or burgers, veggies   Describe what you typically consume as snacks (typical or most recent): fruit, nuts, juice, chips, soda,protien bars, hummus, veggies, crackers, popcorn, black olives   How many ounces of water, or other low calorie drinks, do you drink daily (8 oz=1 glass)? 32 oz   How many ounces of caffeine (coffee, tea, pop) do you drink daily (8 oz=1 glass)? 16 oz   How many ounces of carbonated (pop, beer, sparkling water) drinks do you drinky daily (8 oz=1 glass)? 8 oz   How many ounces of " juice, pop, sweet tea, sports drinks, protein drinks, other sweetened drinks, do you drink daily (8 oz=1 glass)? 24 oz   How many ounces of milk do you drink daily (8 oz=1 glass) 8 oz   Please indicate the type of milk: 2%   How often do you drink alcohol? 2-3 times a week   If you do drink alcohol, how many drinks might you have in a day? (one drink = 5 oz. wine, 1 can/bottle of beer, 1 shot liquor) 1 or 2       Eating Habits 7/31/2018   Do you have any dietary restrictions? Yes   Do you currently binge eat (eat a large amount of food in a short time)? No   Are you an emotional eater? Yes   Do you get up to eat after falling asleep? No   What foods do you crave? pizza, burgers, pasta, fast food, steak, mexican food, ice cream, soda, sweet tea, deep fried and breaded foods       ADDITIONAL INFORMATION:  Pt has been thinking about surgery for ~ 6 months. Mother had lap band but regained weight once removed. Wife's mother, sister and 2 friends have had gastric bypass with good results.     Dining Out History Reviewed With Patient 7/31/2018   How often do you dine out? Around once a week.   Where do you dine out? (select all that apply) sit-down restaurants, fast food chains, take out   What types of food do you order when you dine out? burgers, breakfast eggs, pancakes, sausage       Physical Activity Reviewed With Patient 7/31/2018   How often do you exercise? Less than 1 time per week   What is the duration of your exercise (in minutes)? 30 Minutes   What types of exercise do you do? walking, gym membership, weightlifting   What keeps you from being more active?  Pain, I have just had surgery on one or more of my joints, Shortness of breath, Lack of Time, Too tired       NUTRITION DIAGNOSIS:  Obesity r/t long history of self-monitoring deficit and excessive energy intake aeb BMI >30 kg/m2.    INTERVENTION:  Intervention Provided/Education Provided on post-op diet guidelines, vitamins/minerals essential  post-operatively, GI anatomy of bariatric surgeries, ways to help prepare for post-op diet guidelines pre-operatively, portion/calorie-control, mindful eating.  Provided pt with list of goals RD contact information.      Questions Reviewed With Patient 7/31/2018   How ready are you to make changes regarding your weight? Number 1 = Not ready at all to make changes up to 10 = very ready. 10   How confident are you that you can change? 1 = Not confident that you will be successful making changes up to 10 = very confident. 8       Patient Understanding: good  Expected Compliance: good    GOALS:  Begin taking multivitamin, calcium and Vitamin D per guidelines  Eliminate carbonated beverages  Limit restaurants/fast food to 1x/week    Follow-Up:   Recommend 2-3 follow up visits to assist with lifestyle changes or per insurance.      Time spent with patient: 60 minutes.    Raina Arroyo RD, LD  Clinical Dietitian

## 2018-07-31 NOTE — LETTER
Aguilar Renteria  July 31, 2018        Bariatric Task List      Required Weight loss:    Lose 5 lbs prior to surgery.  Goal Weight: 340 lbs  Tasks:  Have preoperative laboratory tests drawn.   Psychological Evaluation with MMPI and clearance for weight loss surgery.  Achieve clearance from dietitian to see surgeon.

## 2018-07-31 NOTE — Clinical Note
I had the pleasure of seeing Aguilar in our bariatric clinic to evaluate his obesity. He is considering baraitric surgery and had an initial evaluation today. I will keep you abreast of his progress.  Sincerely,   Daniela Hightower PA-C

## 2018-07-31 NOTE — MR AVS SNAPSHOT
After Visit Summary   7/31/2018    Aguilar Renteria    MRN: [id number]           Patient Information     Date Of Birth          1985        Visit Information        Provider Department      7/31/2018 11:00 AM Daniela Hightower PA-C [facility]      Today's Diagnoses     Morbid obesity (H)    -  1    LIZANDRO on CPAP        Screening for iron deficiency anemia        Screening for endocrine, nutritional, metabolic and immunity disorder          Care Instructions    Please refer to your task list created today at your appointment.  Make appointment to return to clinic in 1 month to see the dietician.                    Follow-ups after your visit        Additional Services     NUTRITION REFERRAL       Type of nutrition instruction needed: Weight Loss  Your provider has referred you to:     [facility]                  Follow-up notes from your care team     Return in 1 month (on 8/31/2018).      Your next 10 appointments already scheduled     Sep 26, 2018 10:00 AM CDT   (Arrive by 9:30 AM)   New Visit with Paola Shipman [facility]    [address] [place] [address]   [phone]            Oct 03, 2018  9:00 AM CDT   Return Visit with Paola Shipman [facility]    [address] [place] [address]   [phone]            Oct 24, 2018  9:00 AM CDT   Return Visit with Paola Shipman Bruna   [facility] [place] [facility] (Levine, Susan. \[facility].\""[address] [address]   [phone]              Future tests that were ordered for you today     Open Future Orders        Priority Expected Expires Ordered    CBC with platelets Routine 7/31/2018 1/27/2019 7/31/2018    Comprehensive metabolic panel Routine  "7/31/2018 1/27/2019 7/31/2018    Hemoglobin A1c Routine 7/31/2018 1/27/2019 7/31/2018    Vitamin D Deficiency Routine 7/31/2018 1/27/2019 7/31/2018            Who to contact     If you have questions or need follow up information about today's clinic visit or your schedule please contact [facility] directly at [phone].  Normal or non-critical lab and imaging results will be communicated to you by [employer], letter or phone within 4 business days after the clinic has received the results. If you do not hear from us within 7 days, please contact the clinic through [employer] or phone. If you have a critical or abnormal lab result, we will notify you by phone as soon as possible.  Submit refill requests through [employer] or call your pharmacy and they will forward the refill request to us. Please allow 3 business days for your refill to be completed.          Additional Information About Your Visit        [employer] Information     [employer] gives you secure access to your electronic health record. If you see a primary care provider, you can also send messages to your care team and make appointments. If you have questions, please call your primary care clinic.  If you do not have a primary care provider, please call [phone] and they will assist you.        Care EveryWhere ID     This is your Care EveryWhere ID. This could be used by other organizations to access your [facility] medical records  [id number]        Your Vitals Were     Pulse Temperature Respirations Height Pulse Oximetry BMI (Body Mass Index)    82 98  F (36.7  C) (Oral) 12 6' 3\" (1.905 m) 97% 43.12 kg/m2       Blood Pressure from Last 3 Encounters:   07/31/18 128/83   06/07/18 134/84   08/28/17 112/76    Weight from Last 3 Encounters:   07/31/18 (!) 345 lb (156.5 kg)   07/31/18 (!) 345 lb (156.5 kg)   06/07/18 (!) 341 lb 12.8 oz (155 kg)              We Performed the Following     NUTRITION REFERRAL        Primary Care " Provider Office Phone # Fax #    Jefferson Yanes [phone]       [address] [place] [phone]        Equal Access to Services     DARYN BOWDEN : Lazaro lilo conway chulao Soarturoali, waaxda luqadaha, qaybta kaalmada adeflorence, shayna mejia laForeignlida kristen. So [facility] [phone].    ATENCIÓN: Si habla español, tiene a forrest disposición servicios gratuitos de asistencia lingüística. Llame al [phone].    We comply with applicable federal civil rights laws and [place] laws. We do not discriminate on the basis of race, color, national origin, age, disability, sex, sexual orientation, or gender identity.            Thank you!     Thank you for choosing [facility]  for your care. Our goal is always to provide you with excellent care. Hearing back from our patients is one way we can continue to improve our services. Please take a few minutes to complete the written survey that you may receive in the mail after your visit with us. Thank you!             Your Updated Medication List - Protect others around you: Learn how to safely use, store and throw away your medicines at www.disposemymeds.org.          This list is accurate as of 7/31/18 12:25 PM.  Always use your most recent med list.                   Brand Name Dispense Instructions for use Diagnosis    cetirizine 10 MG tablet    zyrTEC     Take 10 mg by mouth daily

## 2018-07-31 NOTE — MR AVS SNAPSHOT
MRN:[id number]                      After Visit Summary   7/31/2018    Aguilar Renteria    MRN: [id number]           Visit Information        Provider Department      7/31/2018 10:00 AM 3, Sh Jenna Deluca RD [facility]      Your next 10 appointments already scheduled     Sep 26, 2018 10:00 AM CDT   (Arrive by 9:30 AM)   New Visit with Paola Shipman [facility] [place] ([facility])    [address] [place] [address]   [phone]            Oct 03, 2018  9:00 AM CDT   Return Visit with Paola Shipman [facility] ([facility])    [address] [place] [address]   [phone]            Oct 24, 2018  9:00 AM CDT   Return Visit with Paola Shipman PsyD   [facility] ([facility])    [address] [place] [address]   [phone]              MyChart Information     [employer] gives you secure access to your electronic health record. If you see a primary care provider, you can also send messages to your care team and make appointments. If you have questions, please call your primary care clinic.  If you do not have a primary care provider, please call [phone] and they will assist you.        Care EveryWhere ID     This is your Care EveryWhere ID. This could be used by other organizations to access your [facility] medical records  [id number]        Equal Access to Services     DARYN BOWDEN : Hadii lilo conway hadasho Soomaali, waaxda luqadaha, qaybta kaalmada ada, shayna peterson. So [facility] [phone].    ATENCIÓN: Si habla español, tiene a forrest disposición servicios gratuitos de asistencia lingüística. Llame al [phone].    We comply with applicable federal civil rights laws and [place] laws. We do not discriminate on the basis  of race, color, national origin, age, disability, sex, sexual orientation, or gender identity.

## 2018-07-31 NOTE — PATIENT INSTRUCTIONS
Please refer to your task list created today at your appointment.  Make appointment to return to clinic in 1 month to see the dietician.

## 2018-08-06 PROBLEM — R73.03 PREDIABETES: Status: ACTIVE | Noted: 2018-08-06

## 2018-08-20 ENCOUNTER — MYC MEDICAL ADVICE (OUTPATIENT)
Dept: INTERNAL MEDICINE | Facility: CLINIC | Age: 33
End: 2018-08-20

## 2018-08-21 NOTE — TELEPHONE ENCOUNTER
PCP please order labs for pt to have fertility testing to be completed. Thank you.   Taina Lawler RN

## 2018-08-31 ENCOUNTER — OFFICE VISIT (OUTPATIENT)
Dept: SURGERY | Facility: CLINIC | Age: 33
End: 2018-08-31
Payer: COMMERCIAL

## 2018-08-31 VITALS — BODY MASS INDEX: 42.45 KG/M2 | WEIGHT: 315 LBS

## 2018-08-31 DIAGNOSIS — E66.01 MORBID OBESITY (H): ICD-10-CM

## 2018-08-31 PROCEDURE — 97803 MED NUTRITION INDIV SUBSEQ: CPT | Performed by: DIETITIAN, REGISTERED

## 2018-08-31 NOTE — PROGRESS NOTES
"PRE SURGICAL WEIGHT LOSS NUTRITION APPOINTMENT    Aguilar Renteria  1985  male  [id number]  32 year old    ASSESSMENT    Desired Surgical Procedure: undecided but leaning towards sleeve gastrectomy     REASON FOR VISIT:  Aguilar Renteria is a 32 year old year old male presents today for a pre-surgical weight loss follow-up appointment. Patient accompanied by wife, Esperanza.    DIAGNOSIS:  Weight Status Obesity Grade III BMI >40    ANTHROPOMETRICS:  Height: 6'3\"  Initial Weight: 345 lbs     Current weight: (!) 154 kg (339 lb 9.6 oz)  BMI: 42.4 kg/(m^2).    VITAMINS AND MINERALS:  Patient did not start vitamins.    NUTRITION HISTORY:  Breakfast: cereal or eggs/sausage or oatmeal or smoothies   Lunch: chicken salad with croissant + banana/strawberries (eating 1 sandwich when previously was eating 20  Supper: corn on the cob and BLT or chicken soup or rice and chicken dish   Snacks: eats snacks while watching TV  Fluids Consumed: Water and beer, tamia mist  Eating slower: No  Chewing foods thoroughly: No  Take 20-30 minutes to consume each meal: No  Fluids and meals separate by at least 30 minutes: No  Carbonation: yes (beer and Tamia Mist)  Caffeine: none   Additional Information: Patient states was focused the first week after initial meeting but then has been busy the past 3 weeks and has not focused as much on behavior changes.  Long discussion with patient and wife regarding required long term behavior change for life long success.  Patient enjoys beer but is willing to give it up.  Patient feels fluids are what he enjoys most and will need to rethink guzzling water.  Patient hopes to have surgery in January or March.  Patient has set up psychological appointments.      PHYSICAL ACTIVITY:  Patient does not have any deliberate exercise.  Patient walks 5 minutes from parking lot into work but otherwise has not incorporated exercise.  Patient plans to join gym near where he works.    DIAGNOSIS:  Previous Nutrition " Diagnosis: Obesity related to long history of self- monitoring deficit and excessive energy intake evidenced by BMI of 43.1 kg/m2  No change, modified below    Previous goals:   Begin taking multivitamin, calcium and Vitamin D per guidelines-not met  Eliminate carbonated beverages-improving  Limit restaurants/fast food to 1x/week-improving      Current Nutrition Diagnosis: Obesity related to long history of self-monitoring deficit and excessive energy intake as evidenced by BMI of 42.2 kg/m2.    INTERVENTION:  Nutrition Prescription: Recommended energy/nutrient modification.    GOALS:  Begin complete multivitamin, 0035-4791 mg calcium and 2000 international units vitamin D daily  Avoid liquids with meals  Eliminate carbonation    Intervention:  - Discussed progress towards previous goals.  - Reinforced importance of making behavior changes in preparation for bariatric surgery.   - Assessed learning needs and learning preferences     NUTRITION MONITORING AND EVALUATION:  Anticipated compliance: fair-good  Patient demonstrated good understanding.     Follow up: Continue to monitor patient closely regarding weight loss and diet.  # of visits needed: 1-2  Cleared by RD: No     TIME SPENT WITH PATIENT: 30 minutes    Monique Cunningham, RD, LD  [facility] Outpatient Dietitian  [phone] (office phone)

## 2018-08-31 NOTE — MR AVS SNAPSHOT
MRN:[id number]                      After Visit Summary   8/31/2018    Aguilar Renteria    MRN: [id number]           Visit Information        Provider Department      8/31/2018 9:00 AM 2, Fritz West Diet, DEAN[facility]      Your next 10 appointments already scheduled     Sep 26, 2018 10:00 AM CDT   (Arrive by 9:30 AM)   New Visit with Paola Shipman PsyD   [facility] ([facility])    [address] [place] [address]   [phone]            Sep 28, 2018  3:00 PM CDT   Return Bariatric Nutrition Visit with Fritz West Diet 1, RD   [facility]    [address] [place] [address]   [phone]            Oct 03, 2018  9:00 AM CDT   Return Visit with Paola Shipman PsyD   [facility] ([facility])    [address] [place] [address]   [phone]            Oct 24, 2018  9:00 AM CDT   Return Visit with Paola Shipman PsyD   [facility] ([facility])    [address] [place] [address]   [phone]              MyChart Information     [employer] gives you secure access to your electronic health record. If you see a primary care provider, you can also send messages to your care team and make appointments. If you have questions, please call your primary care clinic.  If you do not have a primary care provider, please call [phone] and they will assist you.        Care EveryWhere ID     This is your Care EveryWhere ID. This could be used by other organizations to access your [facility] medical records  [id number]        Equal Access to Services     DARYN BOWDEN AH: Hadii lilo conway hadasho Soomaali, waaxda luqadaha, qaybta kaalmada adeegyada, shayna peterson. So  [facility] [phone].    ATENCIÓN: Si habla español, tiene a forrest disposición servicios gratuitos de asistencia lingüística. Llame al [phone].    We comply with applicable federal civil rights laws and [place] laws. We do not discriminate on the basis of race, color, national origin, age, disability, sex, sexual orientation, or gender identity.

## 2018-09-26 ENCOUNTER — OFFICE VISIT (OUTPATIENT)
Dept: PSYCHOLOGY | Facility: CLINIC | Age: 33
End: 2018-09-26
Payer: COMMERCIAL

## 2018-09-26 DIAGNOSIS — F32.5 MAJOR DEPRESSIVE DISORDER, SINGLE EPISODE IN FULL REMISSION (H): Primary | ICD-10-CM

## 2018-09-26 PROCEDURE — 90834 PSYTX W PT 45 MINUTES: CPT | Performed by: PSYCHOLOGIST

## 2018-09-26 ASSESSMENT — ANXIETY QUESTIONNAIRES
GAD7 TOTAL SCORE: 0
2. NOT BEING ABLE TO STOP OR CONTROL WORRYING: NOT AT ALL
1. FEELING NERVOUS, ANXIOUS, OR ON EDGE: NOT AT ALL
5. BEING SO RESTLESS THAT IT IS HARD TO SIT STILL: NOT AT ALL
7. FEELING AFRAID AS IF SOMETHING AWFUL MIGHT HAPPEN: NOT AT ALL
3. WORRYING TOO MUCH ABOUT DIFFERENT THINGS: NOT AT ALL
6. BECOMING EASILY ANNOYED OR IRRITABLE: NOT AT ALL

## 2018-09-26 ASSESSMENT — PATIENT HEALTH QUESTIONNAIRE - PHQ9: 5. POOR APPETITE OR OVEREATING: NOT AT ALL

## 2018-09-26 NOTE — MR AVS SNAPSHOT
MRN:[id number]                      After Visit Summary   9/26/2018    Aguilar Renteria    MRN: [id number]           Visit Information        Provider Department      9/26/2018 10:00 AM Paola Shipman PsyD [facility]      Your next 10 appointments already scheduled     Sep 28, 2018  3:00 PM CDT   Return Bariatric Nutrition Visit with Fritz Wl Diet 1, RD   [facility]    [address] [place] [address]   [phone]            Oct 03, 2018  9:00 AM CDT   Return Visit with Paola Shipman PsyD   [facility] ([facility])    [address] [place] [id number] [phone]            Oct 24, 2018  9:00 AM CDT   Return Visit with Paola Shipman PsyD   [facility] ([facility])    [address] [place] [id number] [phone]              MyChart Information     [employer] gives you secure access to your electronic health record. If you see a primary care provider, you can also send messages to your care team and make appointments. If you have questions, please call your primary care clinic.  If you do not have a primary care provider, please call [phone] and they will assist you.        Care EveryWhere ID     This is your Care EveryWhere ID. This could be used by other organizations to access your [facility] medical records  [id number]        Equal Access to Services     DARYN BOWDEN : Hadii aad ku hadasho Soomaali, waaxda luqadaha, qaybta kaalmada adeegyada, shayna liu haylida king . So [facility] [phone].    ATENCIÓN: Si habla español, tiene a forrest disposición servicios gratuitos de asistencia lingüística. Llame al [phone].    We comply with applicable federal civil rights laws and [place] laws. We do not discriminate on the basis of  race, color, national origin, age, disability, sex, sexual orientation, or gender identity.

## 2018-09-26 NOTE — PROGRESS NOTES
Progress Note - Initial Session    Client Name:  Aguilar Renteria Date: 9/26/2018         Service Type: psychological evaluation      Session Start Time: 10:00  Session End Time: 10:45      Session Length: 45     Session #: 1     Attendees: Client attended alone         Diagnostic Assessment in progress.  Unable to complete documentation at the conclusion of the first session due to more time needed to complete diagnostic interview.        Mental Status Assessment:  Appearance:   Appropriate   Eye Contact:   Good   Psychomotor Behavior: Normal   Attitude:   Cooperative   Orientation:   All  Speech   Rate / Production: Normal    Volume:  Normal   Mood:    Normal  Affect:    Appropriate   Thought Content:  Clear   Thought Form:  Coherent  Logical   Insight:    Good       Safety Issues and Plan for Safety and Risk Management:  Client denies current fears or concerns for personal safety.  Client denies current or recent suicidal ideation or behaviors.  Client denies current or recent homicidal ideation or behaviors.  Client denies current or recent self injurious behavior or ideation.  Client denies other safety concerns.  A safety and risk management plan has not been developed at this time, however client was given the after-hours number / 911 should there be a change in any of these risk factors.  Client reports there are no firearms in the house.      Diagnostic Criteria:  Major depressive disorder, single episode, full remission        DSM5 Diagnoses: (Sustained by DSM5 Criteria Listed Above)  Diagnoses: 296.26 (F32.5) Major Depressive Disorder, Single Episode,  In full remission _  Psychosocial & Contextual Factors: recent deaths in family, bankruptcy within the last year, wife has health problems  WHODAS 2.0 (12 item)            This questionnaire asks about difficulties due to health conditions. Health conditions  include  disease or illnesses, other health problems that may be short or long lasting,   injuries, mental health or emotional problems, and problems with alcohol or drugs.                     Think back over the past 30 days and answer these questions, thinking about how much  difficulty you had doing the following activities. For each question, please [place] only  one response.    S1 Standing for long periods such as 30 minutes? Mild =           2   S2 Taking care of household responsibilities? Mild =           2   S3 Learning a new task, for example, learning how to get to a new place? None =         1   S4 How much of a problem do you have joining community activities (for example, festivals, [employer] or other activities) in the same way as anyone else can? Mild =           2   S5 How much have you been emotionally affected by your health problems? Moderate =   3     In the past 30 days, how much difficulty did you have in:   S6 Concentrating on doing something for ten minutes? None =         1   S7 Walking a long distance such as a kilometer (or equivalent)? Mild =           2   S8 Washing your whole body? None =         1   S9 Getting dressed? None =         1   S10 Dealing with people you do not know? None =         1   S11 Maintaining a friendship? None =         1   S12 Your day to day work? None =         1     H1 Overall, in the past 30 days, how many days were these difficulties present? Record number of days 5   H2 In the past 30 days, for how many days were you totally unable to carry out your usual activities or work because of any health condition? Record number of days  0   H3 In the past 30 days, not counting the days that you were totally unable, for how many days did you cut back or reduce your usual activities or work because of any health condition? Record number of days 0       Collateral Reports Completed:  Routed note to PCP      PLAN: (Homework, other):  Complete diagnostic interview next session. Client completed the MMPI-2 today.      Paola Shipman PsyD LP

## 2018-09-27 ASSESSMENT — PATIENT HEALTH QUESTIONNAIRE - PHQ9: SUM OF ALL RESPONSES TO PHQ QUESTIONS 1-9: 0

## 2018-09-27 ASSESSMENT — ANXIETY QUESTIONNAIRES: GAD7 TOTAL SCORE: 0

## 2018-09-28 ENCOUNTER — OFFICE VISIT (OUTPATIENT)
Dept: SURGERY | Facility: CLINIC | Age: 33
End: 2018-09-28
Payer: COMMERCIAL

## 2018-09-28 VITALS — BODY MASS INDEX: 39.17 KG/M2 | HEIGHT: 75 IN | WEIGHT: 315 LBS

## 2018-09-28 DIAGNOSIS — E66.01 MORBID OBESITY (H): ICD-10-CM

## 2018-09-28 PROCEDURE — 97803 MED NUTRITION INDIV SUBSEQ: CPT | Performed by: DIETITIAN, REGISTERED

## 2018-09-28 NOTE — MR AVS SNAPSHOT
MRN:[id number]                      After Visit Summary   9/28/2018    Aguilar Renteria    MRN: [id number]           Visit Information        Provider Department      9/28/2018 3:00 PM 1, Fritz West Diet, RD [facility] Weight Loss      Your next 10 appointments already scheduled     Oct 03, 2018  9:00 AM CDT   Return Visit with Paola Shipman PsyD   [facility] [place] ([facility] [place])    [address] [place], [place] [address] [address]   [phone]            Oct 24, 2018  9:00 AM CDT   Return Visit with Paola Shipman PsyD   [facility] [place] ([facility] [place])    [address] [place] [id number]   [phone]            Oct 25, 2018  3:30 PM CDT   Return Bariatric Nutrition Visit with Fritz West Diet 2, RD   [facility] [place] ([facility])    [address] [place] [id number] [phone]              MyChart Information     [employer] gives you secure access to your electronic health record. If you see a primary care provider, you can also send messages to your care team and make appointments. If you have questions, please call your primary care clinic.  If you do not have a primary care provider, please call [phone] and they will assist you.        Care EveryWhere ID     This is your Care EveryWhere ID. This could be used by other organizations to access your [facility] medical records  [id number]        Equal Access to Services     DARYN BOWDEN : Hadii aad ku hadasho Soomaali, waaxda luqadaha, qaybta kaalmada adeegyada, shayna peterson. So [facility] [phone].    ATENCIÓN: Si habla español, tiene a forrest disposición servicios gratuitos de asistencia lingüística. Llame al [phone].    We comply with applicable federal civil rights laws and [place] laws. We do not  discriminate on the basis of race, color, national origin, age, disability, sex, sexual orientation, or gender identity.

## 2018-09-28 NOTE — PROGRESS NOTES
"PRE SURGICAL WEIGHT LOSS NUTRITION APPOINTMENT    Aguilar Renteria  1985  male  [id number]  32 year old    ASSESSMENT    Desired Surgical Procedure: undecided , but leaning toward gastric sleeve     REASON FOR VISIT:  Aguilar Renteria is a 32 year old year old male presents today for a pre-surgical weight loss follow-up appointment. Patient accompanied by self.    DIAGNOSIS:  Weight Status Obesity Grade III BMI >40    ANTHROPOMETRICS:  Height: 190.5 cm (6' 3\")  Initial Weight:  156.5 kg (345 lb)   Weight last visit:  154 kg (339 lb 9.6 oz)  Current weight: (!) 154.5 kg (340 lb 11.2 oz)  BMI: 42.67 kg/(m^2).    VITAMINS AND MINERALS:  Did not start any due to wanting to research the vitamins further    NUTRITION HISTORY:  Breakfast: Life cereal with milk  Lunch: frozen brown rice, veggie and egg, apple  Supper: spaghetti with meat sauce (ground turkey)  Snacks: rare or occasional rice crackers   Fluids Consumed: Water and coffee, [place] water  Eating slower: Yes  Chewing foods thoroughly: Yes  Take 20-30 minutes to consume each meal: Yes  Fluids and meals separate by at least 30 minutes: Yes  Carbonation: yes  Caffeine: yes  Additional Information: After completing some visits with Paola Shipman pt admits he dose struggle with emotional eating. This past month was very stressful (3 family members were ill or having procedures). Patient report he has set an exercise gaol with Paola Shipman.    PHYSICAL ACTIVITY:  None-gym membership is on hold    DIAGNOSIS:  Previous Nutrition Diagnosis: Obesity related to long history of self- monitoring deficit and excessive energy intake evidenced by BMI of 42.4 kg/m2  No change, modified below    Previous goals:   Begin complete multivitamin, 0920-8368 mg calcium and 2000 international units vitamin D daily-not met  Avoid liquids with meals-met  Eliminate carbonation-not met    Current Nutrition Diagnosis: Obesity related to long history of self-monitoring deficit and excessive " energy intake as evidenced by BMI of 42.67 kg/m2.    INTERVENTION:  Nutrition Prescription: Recommended energy/nutrient modification.    GOALS:  Practice alternative to emotional eating  Start: 1 multivitamin/ mineral, 500  mg calcium citrate TID (pt prefers this dosing), 2000 International Units vitamin D  Avoid caffeine and carbonation    Intervention:  _Determined alternative to emotional eating  - Discussed progress towards previous goals.  - Reinforced importance of making behavior changes in preparation for bariatric surgery.   - Assessed learning needs and learning preferences       NUTRITION MONITORING AND EVALUATION:  Anticipated compliance: fair-good  Patient demonstrated good understanding.       Follow up: Continue to monitor patient closely regarding weight loss and diet.  # of visits needed: 1  Cleared by RD: No     TIME SPENT WITH PATIENT: 25 minutes    Foreign Kuhn RD, LD  [facility]  [phone]

## 2018-10-03 ENCOUNTER — OFFICE VISIT (OUTPATIENT)
Dept: PSYCHOLOGY | Facility: CLINIC | Age: 33
End: 2018-10-03
Payer: COMMERCIAL

## 2018-10-03 DIAGNOSIS — F32.5 MAJOR DEPRESSIVE DISORDER, SINGLE EPISODE IN FULL REMISSION (H): Primary | ICD-10-CM

## 2018-10-03 PROCEDURE — 90791 PSYCH DIAGNOSTIC EVALUATION: CPT | Performed by: PSYCHOLOGIST

## 2018-10-03 NOTE — MR AVS SNAPSHOT
MRN:[id number]                      After Visit Summary   10/3/2018    Aguilar Renteria    MRN: [id number]           Visit Information        Provider Department      10/3/2018 9:00 AM aPola Shipman PsyD [facility] [place] [facility] BARIATRICS      Your next 10 appointments already scheduled     Oct 24, 2018  9:00 AM CDT   Return Visit with Paola Shipman PsyD   [facility] [place] ([facility] [place])    [address] [facility]  [place] [id number] [phone]            Oct 25, 2018  3:30 PM CDT   Return Bariatric Nutrition Visit with  Jenna Diet 2, RD   [facility] [place] ([facility])    [address] [place] [address] [phone]              MyChart Information     [employer] gives you secure access to your electronic health record. If you see a primary care provider, you can also send messages to your care team and make appointments. If you have questions, please call your primary care clinic.  If you do not have a primary care provider, please call [phone] and they will assist you.        Care EveryWhere ID     This is your Care EveryWhere ID. This could be used by other organizations to access your [facility] medical records  [id number]        Equal Access to Services     DARYN BOWDEN : Hadii lilo mclain Sojignesh, waaxda luqadaha, qaybta kaalmada adeegyada, shayna peterson. So [facility] [phone].    ATENCIÓN: Si habla español, tiene a forrest disposición servicios gratuitos de asistencia lingüística. Llame al [phone].    We comply with applicable federal civil rights laws and [place] laws. We do not discriminate on the basis of race, color, national origin, age, disability, sex, sexual orientation, or gender identity.

## 2018-10-03 NOTE — PROGRESS NOTES
"                                                                                         Adult Bariatric Pre-Surgical Psychological Assessment - Structured Interview      CLIENT'S NAME: Aguilar Renteria  MRN:   [id number]  :   1985  ACCT. NUMBER: [id number]  DATE OF SERVICE: 10/03/18      Identifying Information:  Client is a 32 year old, ,  male. Client was referred for an evaluation by the [facility] Weight Loss Surgery Team. 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 Client attended the session alone.       Client's Statement of Presenting Concern:  Client is presenting for a psychological assessment as a routine part of the process for pursuing weight loss surgery.       History of Presenting Concern:  Client reports that he has been overweight \"my entire life, I was a heavy kid\".  Reported that his efforts to lose weight in the past typically result in losing about 20-40 pounds, but that he does not sustain the weight loss for more than a few months. Client reports they have attempted to lose weight in the past through Weight Watchers, the \"My Fitness Pal\" radha, portion control, /going to the gym, no carb diets, \"healthier eating\" by following the dietary recommendations his wife is following to improve her health, eliminating \"junk food\".  The client reports they believe the surgery will benefit them by preventing obesity-related illnesses in the future (\"I'm prediabetic\"), improving his mobility so that he can move about more freely and participate in activities that were once enjoyable but now too difficult to do because of his size and lack of endurance (e.g. Biking, hiking, home improvement projects), reducing wear and tear on his joints, reducing back pain. He also reported that his wife has been making some significant lifestyle changes for the better, and they are trying to support each other with healthy " "lifestyle choices.     Client reported a history of problematic eating habits that include excessive portion sizes, mindless eating while watching TV, eating \"junk food\" if it is available and accessible, consuming excess liquid calories in soda. Client reports that other professional(s) are not involved in providing support / services, specifically his weight loss surgery team.    Client reported that there have been a number of significant stressors in his life over the last year (i.e. Death of his wife's sister by drug overdose, financial problems resulting in bankruptcy, wife has had health issues for the last few years, death of his grandmother, death of his aunt). Client reported feeling more settled now and feels he has appropriate supports in place to help him work through stressful situations (friends, family, Sikh, anjel, prayer). Also reported that his financial situation is stabilizing, his wife has been doing better and they are trying to conceive a child, and their family is \"moving on\" from the deaths in the family. He acknowledged that the stressors been time consuming and have meant that he has not been exercising regularly, but his office is now located across the street from his gym and he has recommitted to visiting the gym consistently. Client reported that he is feeling confident and committed to the process of surgery and healthy lifestyle changes. He acknowledged that one of the challenges will be that \"I really like food\", especially cooking and trying new restaurants, but has resolved that the health benefits of better eating habits and losing weight are more important. He also acknowledged that he does not always put much forethought into planning his meals for the day during the work week, and that he either goes without eating, grabs a convenience item on the go, or eats what is provided at his job site (which does not always provide a healthy option for him). Reported that he is " "willing to start planning his daytime meals to improve his nutrition and eating habits at work and \"because we already do that for our evening meals.\"       Social History:  Client reported he grew up in [place], [place]. They were the third born of 3 children. This is an intact family and parents remain . Client reported that his childhood was \"fantastic, loving, strong family, healthy\". Client described his current relationships with family of origin as \"strong, loving, supportive\". Reported that he is close to his siblings and parents. He reported being particularly close to his brother.    Client reported a history of 1 marriage. Client has been  for 5 years. Client reported having no children. He and his wife would like to have children someday. Client identified some stable and meaningful social connections. Client reported that he has not been involved with the legal system.  Client's highest education level was college graduate. Client did not identify any learning problems. There are no ethnic, cultural or Rastafari factors that may be relevant for therapy. Client identified his preferred language to be English. Client reported he does not need the assistance of an  or other support involved in therapy. Modifications will not be used to assist communication in therapy. Client did not serve in the .     Client reports family history includes Anxiety Disorder in his father; Breast Cancer in his paternal grandmother and another family member; Depression in his mother; HEART DISEASE in his maternal grandfather; Obesity in his mother; Thyroid Disease in his mother.    Mental Health History:  Client reported the following biological family members or relatives with mental health issues: Father experienced Anxiety, Mother experienced Depression. Client has received the following mental health services in the past: medication(s) from physician / PCP. Hospitalizations: None.  Client is " "not currently receiving any mental health services. Client took Celexa from 2014 to 2017 to manage symptoms of anxiety and low mood. Reported that he discontinued the medication because symptoms resolved and he acknowledged that \"I don't really like to take medications unless I absolutely have to.\" Reported that symptoms have been manageable, and that any recurrence of symptoms have been situational and tend to resolve quickly when the situation passes.     Chemical Health History:  Client reported no family history of chemical health issues. Client has not received chemical dependency treatment in the past. Client is not currently receiving any chemical dependency treatment. Client reports no problems as a result of their drinking / drug use.      Client Reports:  Client reports using alcohol 3 times per month and has 1-2 glasses of wine and mixed drinks at a time. Client first started drinking at age did not report. Acknowledged that he will feel \"bummed\" about missing out on opportunities to taste different beverages, but reported that he does not like the feeling of being drunk so has never \"drank to excess\". Reported being ready and willing to eliminate alcohol to avoid the risk of becoming easily intoxicated after surgery. Also reported that being more healthy is a worthwhile tradeoff for those missed opportunities.  Client denies using tobacco.  Client denies using marijuana.  Client reports using caffeine 1 times per day and drinks a half cup of coffee at a time. Client started using caffeine at age did not report. Reported that caffeine tends to irritate his stomach if he consumes more than one cup of coffee, so is prepared to eliminate caffeine.  Client denies using street drugs.  Client denies the non-medical use of prescription or over the counter drugs.    CAGE: None of the patient's responses to the CAGE screening were positive / Negative CAGE score   Based on the negative Cage-Aid score and clinical " "interview there  are not indications of drug or alcohol abuse.    Discussed the general effects of drugs and alcohol on health and well-being. Therapist gave client printed information about the effects of chemical use on his health and well being.  We also discussed the specific risks of alcohol use following bariatric surgery, including issues related to cross-addiction.       Significant Losses / Trauma / Abuse / Neglect Issues:  There are indications or report of significant loss, trauma, abuse or neglect issues related to: death of his grandparents; death of one of his close childhood friends when they were age 16; death of his sister-in-law earlier this year. Reported that he does not experience any prolonged symptoms as a result of these losses, and reported that his reactions to those losses were \"pretty typical\".    Issues of possible neglect are not present.      Medical Issues:  Client has had a physical exam to rule out medical causes for current symptoms. Date of last physical exam was within the past year. Client was encouraged to follow up with PCP if symptoms were to develop. The client has a [facility] Primary Care Provider, who is named Jefferson Yanes. The client reports not having a psychiatrist. Client reports no current medical concerns. The client denies the presence of chronic or episodic pain. There are not any additional significant nutritional concerns outside of the problematic eating habits mentioned above. Client denies past or current symptoms of disordered eating.    Client reports not having any current medications.    Client Allergies:  No Known Allergies      Medical History:  Past Medical History:   Diagnosis Date     Anxiety May 2014    Took Citalapram for anxiety, no longer on medication.     Major depressive disorder with single episode, remission status unspecified          Medication Adherence:  N/A - Client does not have prescribed psychiatric medications.    Client was " provided recommendation to follow-up with prescribing physician.    Mental Status Assessment:  Appearance:   Appropriate   Eye Contact:   Good   Psychomotor Behavior: Normal   Attitude:   Cooperative   Orientation:   All  Speech   Rate / Production: Normal    Volume:  Normal   Mood:    Normal  Affect:    Appropriate   Thought Content:  Clear   Thought Form:  Coherent  Logical   Insight:    Good       Review of Symptoms:  Depression: No symptoms  Cata:  No symptoms  Psychosis: No symptoms  Anxiety: No symptoms  Panic:  No symptoms  Post Traumatic Stress Disorder: No symptoms  Obsessive Compulsive Disorder: No symptoms  Eating Disorder: No symptoms  Oppositional Defiant Disorder: No symptoms  ADD / ADHD: No symptoms  Conduct Disorder: No symptoms        Safety Issues and Plan for Safety and Risk Management:  Client denies a history of suicidal ideation, suicide attempts, self-injurious behavior, homicidal ideation, homicidal behavior and and other safety concerns    Client denies current fears or concerns for personal safety.  Client denies current or recent suicidal ideation or behaviors.  Client denies current or recent homicidal ideation or behaviors.  Client denies current or recent self injurious behavior or ideation.  Client denies other safety concerns.  Client reports there are no firearms in the house.  A safety and risk management plan has not been developed at this time, however client was given the after-hours number / 911 should there be a change in any of these risk factors.      Patient's Strengths and Limitations:  Client identified the following strengths or resources that will help him succeed in counseling: Mandaeism, anjel / spirituality, friends / good social support, family support and intelligence. Client identified the following supports: family, Congregational / spirituality and friends. Things that may interfere with the clients success in counseling include:none reported.    Diagnostic  Criteria:  Major depressive disorder, single episode, in full remission      Functional Status:  Client's symptoms are causing reduced functional status in the following areas: Activities of Daily Living - reduced mobility; reduced participation in activities that were once enjoyable (e.g. biking, etc); reduced endurance for activities      DSM5 Diagnoses: (Sustained by DSM5 Criteria Listed Above)  Diagnoses: Major depressive disorder, single episode, in full remission  Psychosocial & Contextual Factors: recent deaths in family, bankruptcy within the last year, wife has health problems  WHODAS 2.0 (12 item): 18    Attendance Agreement:  Client has signed Attendance Agreement:Yes      Preliminary Treatment Plan:    Client will return for follow-up session next month.  Client will continue with scheduled weight loss surgery clinic appointments.  he has writer's contact information and is aware that he may contact writer in the meantime as needed.      Client will work on the following homework assignment: 1. Increase exercise to at least one visit to the gym weekly.  2. Create a weekly meal plan for lunches during work days.  3. Create a script for how you would like to discuss your need for time off for surgery with your supervisor.    The client reports no currently identified Evangelical, ethnic or cultural issues relevant to therapy.     services are not indicated.    Modifications to assist communication are not indicated.    The client is receiving treatment / structured support from the following professional(s) / service and treatment. Collaboration will be initiated with: weight loss surgery team.    Referral to another professional/service is not indicated at this time..    A Release of Information is not needed at this time.    Report to child / adult protection services was NA.    Client will have access to their [facility]' medical record.    Paola Shipman PsyD LP  10/3/2018

## 2018-10-25 ENCOUNTER — OFFICE VISIT (OUTPATIENT)
Dept: SURGERY | Facility: CLINIC | Age: 33
End: 2018-10-25
Payer: COMMERCIAL

## 2018-10-25 VITALS — WEIGHT: 315 LBS | BODY MASS INDEX: 42.6 KG/M2

## 2018-10-25 NOTE — MR AVS SNAPSHOT
MRN:[id number]                      After Visit Summary   10/25/2018    Aguilar Renteria    MRN: [id number]           Visit Information        Provider Department      10/25/2018 3:30 PM 2, Fritz West Diet, RD [facility]      Your next 10 appointments already scheduled     Nov 01, 2018 10:00 AM CDT   Return Visit with Paola Shipman PsyD   [facility] ([facility])    [address] [place], [place] [address] [address] [phone]            Nov 26, 2018  3:00 PM CST   Return Bariatric Nutrition Visit with Fritz Wl Diet 2, RD   [facility] ([facility])    [address] [place] [id number] [phone]              MyChart Information     [employer] gives you secure access to your electronic health record. If you see a primary care provider, you can also send messages to your care team and make appointments. If you have questions, please call your primary care clinic.  If you do not have a primary care provider, please call [phone] and they will assist you.        Care EveryWhere ID     This is your Care EveryWhere ID. This could be used by other organizations to access your [facility] medical records  [id number]        Equal Access to Services     DARYN BOWDEN : Hadii lilo conway hadasho Soarturoali, waaxda luqadaha, qaybta kaalmada adeegyada, shayna peterson. So [facility] [phone].    ATENCIÓN: Si habla español, tiene a forrest disposición servicios gratuitos de asistencia lingüística. Llame al [phone].    We comply with applicable federal civil rights laws and [place] laws. We do not discriminate on the basis of race, color, national origin, age, disability, sex, sexual orientation, or gender identity.

## 2018-10-25 NOTE — PROGRESS NOTES
"PRE SURGICAL WEIGHT LOSS NUTRITION APPOINTMENT    Aguilar Renteria  1985  male  [id number]  33 year old    ASSESSMENT    Desired Surgical Procedure: undecided     REASON FOR VISIT:  Aguilar Renteria is a 33 year old year old male presents today for a pre-surgical weight loss follow-up appointment. Patient accompanied by self.    DIAGNOSIS:  Weight Status Obesity Grade III BMI >40    ANTHROPOMETRICS:  Height: 6'3\"  Initial Weight: 345 lbs     Weight last visit: 340.8 lbs     Current weight: (!) 154.6 kg (340 lb 12.8 oz)  BMI: 42.6 kg/(m^2).    VITAMINS AND MINERALS:  Not taking consistently     NUTRITION HISTORY:  Breakfast: eggs taco (1/2 strip ronquillo, eggs, cilantro, avocado, corn tortilla)  Lunch: chicken, cheese caesar salad; soup   Supper: rice, pork, veggies  Snacks: rice crackers with hummus or black olives or popcorn   Fluids Consumed: Water and milk (A2 milk)   Eating slower: Yes  Chewing foods thoroughly: Yes  Take 20-30 minutes to consume each meal: Yes  Fluids and meals separate by at least 30 minutes: No  Carbonation: none  Caffeine: coffee (1-2 cups per week)  Additional Information: Patient states has not been focused the past 2-3 weeks.  Patient does not want to have surgery until 2019.  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  Patient's wife supportive of surgery.  Patient feels he can refocus on changes necessary for surgery.  Patient has made positive change in ordering healthier food options for lunch time events that he needs to coordinate food for.      PHYSICAL ACTIVITY:  Type: walking   Frequency: 1-2 (days per week)    DIAGNOSIS:  Previous Nutrition Diagnosis: Obesity related to long history of self- monitoring deficit and excessive energy intake evidenced by BMI of 42.6 kg/m2  No change, modified below    Previous goals:   Practice alternatives to emotional eating-improving  Start vitamins-not met  Avoid caffeine and carbonation-improving     Current Nutrition Diagnosis: Obesity related to long " history of self-monitoring deficit and excessive energy intake as evidenced by BMI of 42.6 kg/m2.    INTERVENTION:  Nutrition Prescription: Recommended energy/nutrient modification.    GOALS:  Exercise 2-3 times per week  Eliminate caffeine  Start vitamins    Intervention:  - Discussed progress towards previous goals.  - Reinforced importance of making behavior changes in preparation for bariatric surgery.   - Assessed learning needs and learning preferences    NUTRITION MONITORING AND EVALUATION:  Anticipated compliance: fair-good  Patient demonstrated good understanding.     Follow up: Continue to monitor patient closely regarding weight loss and diet.  # of visits needed: 1-2  Cleared by RD: No     TIME SPENT WITH PATIENT: 30 minutes    Monique Cunningham, RD, LD  [facility] [place] [facility] Dietitian  [phone] (office phone)

## 2018-11-01 ENCOUNTER — OFFICE VISIT (OUTPATIENT)
Dept: PSYCHOLOGY | Facility: CLINIC | Age: 33
End: 2018-11-01
Payer: COMMERCIAL

## 2018-11-01 ENCOUNTER — FCC EXTENDED DOCUMENTATION (OUTPATIENT)
Dept: PSYCHOLOGY | Facility: CLINIC | Age: 33
End: 2018-11-01

## 2018-11-01 DIAGNOSIS — F32.5 MAJOR DEPRESSIVE DISORDER, SINGLE EPISODE IN FULL REMISSION (H): Primary | ICD-10-CM

## 2018-11-01 PROCEDURE — 90834 PSYTX W PT 45 MINUTES: CPT | Performed by: PSYCHOLOGIST

## 2018-11-01 PROCEDURE — 96101 HC PSYCH TST BY PSYCHOLOGIST/MD, PER HR MMPI-2: CPT | Mod: 59 | Performed by: PSYCHOLOGIST

## 2018-11-01 NOTE — MR AVS SNAPSHOT
MRN:[id number]                      After Visit Summary   11/1/2018    Aguilar Renteria    MRN: [id number]           Visit Information        Provider Department      11/1/2018 10:00 AM Paola Shipman PsyD [facility]      Your next 10 appointments already scheduled     Nov 26, 2018  3:00 PM CST   Return Bariatric Nutrition Visit with Fritz Wl Diet 2, RD   [facility] ([facility])    [address] [place] [address] [phone]              MyChart Information     [employer] gives you secure access to your electronic health record. If you see a primary care provider, you can also send messages to your care team and make appointments. If you have questions, please call your primary care clinic.  If you do not have a primary care provider, please call [phone] and they will assist you.        Care EveryWhere ID     This is your Care EveryWhere ID. This could be used by other organizations to access your [facility] medical records  [id number]        Equal Access to Services     DARYN BOWDEN : Hadii aad ku hadasho Sojignesh, waaxda luqadaha, qaybta kaalmada adeflorence, shayna king . So [facility] [phone].    ATENCIÓN: Si habla español, tiene a forrest disposición servicios gratuitos de asistencia lingüística. Llame al [phone].    We comply with applicable federal civil rights laws and [place] laws. We do not discriminate on the basis of race, color, national origin, age, disability, sex, sexual orientation, or gender identity.

## 2018-11-15 NOTE — PROGRESS NOTES
"                                                                                        Adult Bariatric Pre-Surgical Psychological Assessment - Structured Interview        CLIENT'S NAME:                 Aguilar Renteria  MRN:                                                         [id number]  :                                                         1985  ACCT. NUMBER:                 [id number]  DATE OF SERVICE:            10/03/18        Identifying Information:  Client is a 32 year old, ,  male. Client was referred for an evaluation by the [facility] Team. 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 Client attended the session alone.         Client's Statement of Presenting Concern:  Client is presenting for a psychological assessment as a routine part of the process for pursuing weight loss surgery.         History of Presenting Concern:  Client reports that he has been overweight \"my entire life, I was a heavy kid\".  Reported that his efforts to lose weight in the past typically result in losing about 20-40 pounds, but that he does not sustain the weight loss for more than a few months. Client reports they have attempted to lose weight in the past through Weight Watchers, the \"My Fitness Pal\" radha, portion control, /going to the gym, no carb diets, \"healthier eating\" by following the dietary recommendations his wife is following to improve her health, eliminating \"junk food\".  The client reports they believe the surgery will benefit them by preventing obesity-related illnesses in the future (\"I'm prediabetic\"), improving his mobility so that he can move about more freely and participate in activities that were once enjoyable but now too difficult to do because of his size and lack of endurance (e.g. Biking, hiking, home improvement projects), reducing wear and tear on his joints, reducing back " "pain. He also reported that his wife has been making some significant lifestyle changes for the better, and they are trying to support each other with healthy lifestyle choices.      Client reported a history of problematic eating habits that include excessive portion sizes, mindless eating while watching TV, eating \"junk food\" if it is available and accessible, consuming excess liquid calories in soda. Client reports that other professional(s) are not involved in providing support / services, specifically his weight loss surgery team.     Client reported that there have been a number of significant stressors in his life over the last year (i.e. Death of his wife's sister by drug overdose, financial problems resulting in bankruptcy, wife has had health issues for the last few years, death of his grandmother, death of his aunt). Client reported feeling more settled now and feels he has appropriate supports in place to help him work through stressful situations (friends, family, Gnosticist, anjel, prayer). Also reported that his financial situation is stabilizing, his wife has been doing better and they are trying to conceive a child, and their family is \"moving on\" from the deaths in the family. He acknowledged that the stressors been time consuming and have meant that he has not been exercising regularly, but his office is now located across the street from his gym and he has recommitted to visiting the gym consistently. Client reported that he is feeling confident and committed to the process of surgery and healthy lifestyle changes. He acknowledged that one of the challenges will be that \"I really like food\", especially cooking and trying new restaurants, but has resolved that the health benefits of better eating habits and losing weight are more important. He also acknowledged that he does not always put much forethought into planning his meals for the day during the work week, and that he either goes without eating, " "grabs a convenience item on the go, or eats what is provided at his job site (which does not always provide a healthy option for him). Reported that he is willing to start planning his daytime meals to improve his nutrition and eating habits at work and \"because we already do that for our evening meals.\"         Social History:  Client reported he grew up in [place], [place]. They were the third born of 3 children. This is an intact family and parents remain . Client reported that his childhood was \"fantastic, loving, strong family, healthy\". Client described his current relationships with family of origin as \"strong, loving, supportive\". Reported that he is close to his siblings and parents. He reported being particularly close to his brother.     Client reported a history of 1 marriage. Client has been  for 5 years. Client reported having no children. He and his wife would like to have children someday. Client identified some stable and meaningful social connections. Client reported that he has not been involved with the legal system.  Client's highest education level was college graduate. Client did not identify any learning problems. There are no ethnic, cultural or Adventism factors that may be relevant for therapy. Client identified his preferred language to be English. Client reported he does not need the assistance of an  or other support involved in therapy. Modifications will not be used to assist communication in therapy. Client did not serve in the .      Client reports family history includes Anxiety Disorder in his father; Breast Cancer in his paternal grandmother and another family member; Depression in his mother; HEART DISEASE in his maternal grandfather; Obesity in his mother; Thyroid Disease in his mother.     Mental Health History:  Client reported the following biological family members or relatives with mental health issues: Father experienced Anxiety, Mother " "experienced Depression. Client has received the following mental health services in the past: medication(s) from physician / PCP. Hospitalizations: None.  Client is not currently receiving any mental health services. Client took Celexa from 2014 to 2017 to manage symptoms of anxiety and low mood. Reported that he discontinued the medication because symptoms resolved and he acknowledged that \"I don't really like to take medications unless I absolutely have to.\" Reported that symptoms have been manageable, and that any recurrence of symptoms have been situational and tend to resolve quickly when the situation passes.      Chemical Health History:  Client reported no family history of chemical health issues. Client has not received chemical dependency treatment in the past. Client is not currently receiving any chemical dependency treatment. Client reports no problems as a result of their drinking / drug use.        Client Reports:  Client reports using alcohol 3 times per month and has 1-2 glasses of wine and mixed drinks at a time. Client first started drinking at age did not report. Acknowledged that he will feel \"bummed\" about missing out on opportunities to taste different beverages, but reported that he does not like the feeling of being drunk so has never \"drank to excess\". Reported being ready and willing to eliminate alcohol to avoid the risk of becoming easily intoxicated after surgery. Also reported that being more healthy is a worthwhile tradeoff for those missed opportunities.  Client denies using tobacco.  Client denies using marijuana.  Client reports using caffeine 1 times per day and drinks a half cup of coffee at a time. Client started using caffeine at age did not report. Reported that caffeine tends to irritate his stomach if he consumes more than one cup of coffee, so is prepared to eliminate caffeine.  Client denies using street drugs.  Client denies the non-medical use of prescription or over the " "counter drugs.     CAGE: None of the patient's responses to the CAGE screening were positive / Negative CAGE score   Based on the negative Cage-Aid score and clinical interview there  are not indications of drug or alcohol abuse.     Discussed the general effects of drugs and alcohol on health and well-being. Therapist gave client printed information about the effects of chemical use on his health and well being.  We also discussed the specific risks of alcohol use following bariatric surgery, including issues related to cross-addiction.         Significant Losses / Trauma / Abuse / Neglect Issues:  There are indications or report of significant loss, trauma, abuse or neglect issues related to: death of his grandparents; death of one of his close childhood friends when they were age 16; death of his sister-in-law earlier this year. Reported that he does not experience any prolonged symptoms as a result of these losses, and reported that his reactions to those losses were \"pretty typical\".     Issues of possible neglect are not present.        Medical Issues:  Client has had a physical exam to rule out medical causes for current symptoms. Date of last physical exam was within the past year. Client was encouraged to follow up with PCP if symptoms were to develop. The client has a [place] Primary Care Provider, who is named Jefferson Yanes. The client reports not having a psychiatrist. Client reports no current medical concerns. The client denies the presence of chronic or episodic pain. There are not any additional significant nutritional concerns outside of the problematic eating habits mentioned above. Client denies past or current symptoms of disordered eating.     Client reports not having any current medications.     Client Allergies:  No Known Allergies        Medical History:   Past Medical History         Past Medical History:   Diagnosis Date     Anxiety May 2014     Took Citalapram for anxiety, no longer " on medication.     Major depressive disorder with single episode, remission status unspecified                 Medication Adherence:  N/A - Client does not have prescribed psychiatric medications.     Client was provided recommendation to follow-up with prescribing physician.     Mental Status Assessment:  Appearance:                                                          Appropriate   Eye Contact:                                                         Good   Psychomotor Behavior:                    Normal   Attitude:                                                                 Cooperative   Orientation:                                                           All  Speech                        Rate / Production:                 Normal                         Volume:                                           Normal   Mood:                                                                              Normal  Affect:                                                                              Appropriate   Thought Content:                                        Clear   Thought Form:                                            Coherent  Logical   Insight:                                                                             Good         Review of Symptoms:  Depression:               No symptoms  Cata:                                 No symptoms  Psychosis:                 No symptoms  Anxiety:                     No symptoms  Panic:                                  No symptoms  Post Traumatic Stress Disorder:                  No symptoms  Obsessive Compulsive Disorder:                 No symptoms  Eating Disorder:                    No symptoms  Oppositional Defiant Disorder:                     No symptoms  ADD / ADHD:            No symptoms  Conduct Disorder:                 No symptoms           Safety Issues and Plan for Safety and Risk Management:  Client denies a history of suicidal ideation, suicide  attempts, self-injurious behavior, homicidal ideation, homicidal behavior and and other safety concerns     Client denies current fears or concerns for personal safety.  Client denies current or recent suicidal ideation or behaviors.  Client denies current or recent homicidal ideation or behaviors.  Client denies current or recent self injurious behavior or ideation.  Client denies other safety concerns.  Client reports there are no firearms in the house.  A safety and risk management plan has not been developed at this time, however client was given the after-hours number / 911 should there be a change in any of these risk factors.        Patient's Strengths and Limitations:  Client identified the following strengths or resources that will help him succeed in counseling: Catholic, anjel / spirituality, friends / good social support, family support and intelligence. Client identified the following supports: family, Hindu / spirituality and friends. Things that may interfere with the clients success in counseling include:none reported.     Diagnostic Criteria:  Major depressive disorder, single episode, in full remission        Functional Status:  Client's symptoms are causing reduced functional status in the following areas: Activities of Daily Living - reduced mobility; reduced participation in activities that were once enjoyable (e.g. biking, etc); reduced endurance for activities        DSM5 Diagnoses: (Sustained by DSM5 Criteria Listed Above)  Diagnoses:            Major depressive disorder, single episode, in full remission  Psychosocial & Contextual Factors: recent deaths in family, bankruptcy within the last year, wife has health problems  WHODAS 2.0 (12 item): 18     Attendance Agreement:  Client has signed Attendance Agreement:Yes        Preliminary Treatment Plan:     Client will return for follow-up session next month.  Client will continue with scheduled weight loss surgery clinic appointments.  he has  writer's contact information and is aware that he may contact writer in the meantime as needed.       Client will work on the following homework assignment: 1. Increase exercise to at least one visit to the gym weekly.  2. Create a weekly meal plan for lunches during work days.  3. Create a script for how you would like to discuss your need for time off for surgery with your supervisor.     The client reports no currently identified Zoroastrian, ethnic or cultural issues relevant to therapy.      services are not indicated.     Modifications to assist communication are not indicated.     The client is receiving treatment / structured support from the following professional(s) / service and treatment. Collaboration will be initiated with: weight loss surgery team.     Referral to another professional/service is not indicated at this time..     A Release of Information is not needed at this time.     Report to child / adult protection services was NA.     Client will have access to their [facility]' medical record.     Paola Shipman PsyD LP 10/3/2018        Summary of MMPI-2 Results      Client completed the [place] Multiphasic Personality Inventory-2 (MMPI-2), a self-report personality inventory, as a routine part of the psychological assessment required for pursuing weight loss surgery.  Validity scales indicate that the client responded in an open way, resulting in a valid and interpretable profile. Results of the remaining scales yielded results without significant elevations. This would suggest that client is functioning well within the demands of his day to day life. His MMPI test results were consistent with his report upon direct interview.       Assessment of eating disorder symptoms indicates that client has not met criteria for anorexia nervosa or bulimia nervosa.  Client  reported he has not used laxatives, has not purged, and has not used excessive exercise as a means of  weight control.  Client does not meet criteria for binge eating disorder.  Client reported he does not eat until uncomfortably full.  he reported he does not feel disgusted, depressed, or guilty after eating.       Client's score on the PHQ-9, a brief self-report measure of depressive symptoms, was 0, indicating no depressive symptomatology.  Client's score on the DARIO-7, a brief self-report measure of anxiety symptoms, was 0, indicating no anxiety symptomatology.  Client did acknowledge a history of depression and anxiety symptoms that were properly addressed and have been in remission. He stated that he has an understanding of how to address any future recurrences of symptoms and has adequate supports and resources in place if needed.        Progress Note     Client Name: Aguilar Renteria             Date:             11/1/2018                               Service Type:           Individual                            Session Start Time:            10:00                                   Session End Time:              10:45                            Session Length:                  45                           Session #:                3                           Attendees:               Client attended alone         DATA                            Progress Since Last Session (Related to Symptoms / Goals / Homework):                        Symptoms: Stable                           Homework: 1. Increase exercise to at least one visit to the gym weekly--reported goal has been met.                         2. Create a weekly meal plan for lunches during work days--reported goal has been met. Reported that he has been more proactive in planning his lunch needs for the work week and has been bringing meals when needed.  3. Create a script for how you would like to discuss your need for time off for surgery with your supervisor. Reported that this is in progress. Reported that there was a recent and unexpected resignation in  his department that could potentially affect client's ability to take time off in the timeframe he originally planned. Reported that he is rethinking the timeframe he wishes to present to his supervisor. We discussed ways to adjust his script and client reported feeling as though he has enough direction to proceed effectively.                            Current / Ongoing Stressors and Concerns:                        Demanding work schedule, wife with health issues, recovering from financial issues.                           Treatment Objective(s) Addressed in This Session:                       Reviewed homework  Reviewed results of the MMPI-2  Discussed changes that may occur in relationships                           Intervention:                        One month follow-up post psychological assessment.  Reviewed client's progress with homework over the past month.  See above.                          Discussed changes that may occur in relationships following weight loss surgery            and how to manage these changes.   Client's specific concerns about talking with his boss about a need for time off were discussed in session. Client's questions were answered. I reminded client that I remain a resource if additional questions or concerns arise.                         Reviewed results of the MMPI-2 evaluation.  Client was in agreement with the results.  Client's questions were answered.           ASSESSMENT: Current Emotional / Mental Status (status of significant symptoms):                        Risk status (Self / Other harm or suicidal ideation)                        Client denies current fears or concerns for personal safety.                        Client denies current or recent suicidal ideation or behaviors.                        Client denies current or recent homicidal ideation or behaviors.                        Client denies current or recent self injurious behavior or ideation.                         Client denies other safety concerns.                        A safety and risk management plan has not been developed at this time, however client was given the after-hours number / 911 should there be a change in any of these risk factors.                           Appearance:                                                          Appropriate                         Eye Contact:                                                         Good                         Psychomotor Behavior:                    Normal                         Attitude:                                                                 Cooperative                         Orientation:                                                           All                        Speech                                              Rate / Production:                 Normal                                               Volume:                                           Normal                         Mood:                                                                              Normal                        Affect:                                                                              Appropriate                         Thought Content:                                        Clear                         Thought Form:                                            Coherent  Logical                         Insight:                                                                             Good                            Medication Review:                        No current psychiatric medications prescribed                           Medication Compliance:                        NA                           Changes in Health Issues:                        None reported                           Chemical Use Review:                        Substance Use: Chemical use reviewed, no active concerns identified                            Tobacco Use: No  current tobacco use.                             Collateral Reports Completed:                        Routed note to Care Team Member(s)     Summary and Final Recommendation:     To summarize the 3 primary conditions being evaluated in the psychological assessment:                           1. Client is prepared to comply with ongoing aftercare and lifestyle              changes after surgery--condition satisfied                           Client has made some meaningful initial changes to their eating and activity habits.  Client reported an understanding of the need for lifestyle changes to eating and activity habits.  Client reported they are willing to focus on making lifestyle changes to eating and activity habits post surgery.   Client reported that he is confident in his ability to make the necessary changes and has been making progress on his goals from this evaluation, and his stated goals from the weight loss clinic.                               2. Client is emotionally stable to proceed with surgery--condition satisfied                            Results of the MMPI-2, DARIO-7, PHQ-9, and direct interview suggest that client is functioning properly within the demands of his day to day life. Client did acknowledge a history of depression and anxiety symptoms that were properly addressed and have been in remission. He denies current symptoms of anxiety or depression, which was consistent with the results of the MMPI-2, DARIO-7, and PHQ-9. He stated that he has an understanding of how to address any future recurrences of symptoms and has adequate supports and resources in place if needed.                              3. Client is cognitively capable of understanding the risks of the procedure--condition satisfied                           Client has been able to demonstrate that he understands the risks of surgery (compared to the risks of not having surgery).  Client has concluded that the long term health  risks of being overweight exceed the risks of pursuing surgery.        PLAN: (Client Tasks / Therapist Tasks / Other)     Client has completed psychological assessment required for bariatric surgery  Complete report will be forwarded to the weight loss surgery clinic for review.       Recommend standard post-surgical follow up, with sessions 1 and 3 months postsurgery, to assess client's functioning and adjustment post-surgical lifestyle.       Client was encouraged to attend a weight loss surgery support group, starting before surgery and continuing afterwards, for additional accountability and support.     Client was provided with writer's contact information and is aware that he may contact writer sooner as needed.       Paola Shipman PsyD LP  11/1/2018

## 2018-11-15 NOTE — PROGRESS NOTES
Summary of MMPI-2 Results     Client completed the [place] Multiphasic Personality Inventory-2 (MMPI-2), a self-report personality inventory, as a routine part of the psychological assessment required for pursuing weight loss surgery.  Validity scales indicate that the client responded in an open way, resulting in a valid and interpretable profile. Results of the remaining scales yielded results without significant elevations. This would suggest that client is functioning well within the demands of his day to day life. His MMPI test results were consistent with his report upon direct interview.      Assessment of eating disorder symptoms indicates that client has not met criteria for anorexia nervosa or bulimia nervosa.  Client  reported he has not used laxatives, has not purged, and has not used excessive exercise as a means of weight control.  Client does not meet criteria for binge eating disorder.  Client reported he does not eat until uncomfortably full.  he reported he does not feel disgusted, depressed, or guilty after eating.      Client's score on the PHQ-9, a brief self-report measure of depressive symptoms, was 0, indicating no depressive symptomatology.  Client's score on the DARIO-7, a brief self-report measure of anxiety symptoms, was 0, indicating no anxiety symptomatology.  Client did acknowledge a history of depression and anxiety symptoms that were properly addressed and have been in remission. He stated that he has an understanding of how to address any future recurrences of symptoms and has adequate supports and resources in place if needed.      Progress Note    Client Name: Aguilar Renteria Date: 11/1/2018         Service Type: Individual      Session Start Time: 10:00  Session End Time: 10:45      Session Length: 45     Session #: 3     Attendees: Client attended alone       DATA      Progress Since Last Session (Related to Symptoms / Goals / Homework):   Symptoms: Stable     Homework: 1. Increase  exercise to at least one visit to the gym weekly--reported goal has been met.   2. Create a weekly meal plan for lunches during work days--reported goal has been met. Reported that he has been more proactive in planning his lunch needs for the work week and has been bringing meals when needed.  3. Create a script for how you would like to discuss your need for time off for surgery with your supervisor. Reported that this is in progress. Reported that there was a recent and unexpected resignation in his department that could potentially affect client's ability to take time off in the timeframe he originally planned. Reported that he is rethinking the timeframe he wishes to present to his supervisor. We discussed ways to adjust his script and client reported feeling as though he has enough direction to proceed effectively.      Current / Ongoing Stressors and Concerns:   Demanding work schedule, wife with health issues, recovering from financial issues.     Treatment Objective(s) Addressed in This Session:     Reviewed homework  Reviewed results of the MMPI-2  Discussed changes that may occur in relationships     Intervention:   One month follow-up post psychological assessment.  Reviewed client's progress with homework over the past month.  See above.     Discussed changes that may occur in relationships following weight loss surgery  and how to manage these changes.   Client's specific concerns about talking with his boss about a need for time off were discussed in session. Client's questions were answered. I reminded client that I remain a resource if additional questions or concerns arise.    Reviewed results of the MMPI-2 evaluation.  Client was in agreement with the results.  Client's questions were answered.          ASSESSMENT: Current Emotional / Mental Status (status of significant symptoms):   Risk status (Self / Other harm or suicidal ideation)   Client denies current fears or concerns for personal  safety.   Client denies current or recent suicidal ideation or behaviors.   Client denies current or recent homicidal ideation or behaviors.   Client denies current or recent self injurious behavior or ideation.   Client denies other safety concerns.   A safety and risk management plan has not been developed at this time, however client was given the after-hours number / 911 should there be a change in any of these risk factors.     Appearance:   Appropriate    Eye Contact:   Good    Psychomotor Behavior: Normal    Attitude:   Cooperative    Orientation:   All   Speech    Rate / Production: Normal     Volume:  Normal    Mood:    Normal   Affect:    Appropriate    Thought Content:  Clear    Thought Form:  Coherent  Logical    Insight:    Good      Medication Review:   No current psychiatric medications prescribed     Medication Compliance:   NA     Changes in Health Issues:   None reported     Chemical Use Review:   Substance Use: Chemical use reviewed, no active concerns identified      Tobacco Use: No current tobacco use.       Collateral Reports Completed:   Routed note to Care Team Member(s)    Summary and Final Recommendation:    To summarize the 3 primary conditions being evaluated in the psychological assessment:     1. Client is prepared to comply with ongoing aftercare and lifestyle  changes after surgery--condition satisfied     Client has made some meaningful initial changes to their eating and activity habits.  Client reported an understanding of the need for lifestyle changes to eating and activity habits.  Client reported they are willing to focus on making lifestyle changes to eating and activity habits post surgery.   Client reported that he is confident in his ability to make the necessary changes and has been making progress on his goals from this evaluation, and his stated goals from the weight loss clinic.        2. Client is emotionally stable to proceed with surgery--condition  satisfied     Results of the MMPI-2, DARIO-7, PHQ-9, and direct interview suggest that client is functioning properly within the demands of his day to day life. Client did acknowledge a history of depression and anxiety symptoms that were properly addressed and have been in remission. He denies current symptoms of anxiety or depression, which was consistent with the results of the MMPI-2, DARIO-7, and PHQ-9. He stated that he has an understanding of how to address any future recurrences of symptoms and has adequate supports and resources in place if needed.       3. Client is cognitively capable of understanding the risks of the procedure--condition satisfied     Client has been able to demonstrate that he understands the risks of surgery (compared to the risks of not having surgery).  Client has concluded that the long term health risks of being overweight exceed the risks of pursuing surgery.      PLAN: (Client Tasks / Therapist Tasks / Other)    Client has completed psychological assessment required for bariatric surgery  Complete report will be forwarded to the weight loss surgery clinic for review.      Recommend standard post-surgical follow up, with sessions 1 and 3 months postsurgery, to assess client's functioning and adjustment post-surgical lifestyle.      Client was encouraged to attend a weight loss surgery support group, starting before surgery and continuing afterwards, for additional accountability and support.    Client was provided with writer's contact information and is aware that he may contact writer sooner as needed.      Paola Shipman PsyD LP  11/1/2018

## 2018-11-26 ENCOUNTER — OFFICE VISIT (OUTPATIENT)
Dept: SURGERY | Facility: CLINIC | Age: 33
End: 2018-11-26
Payer: COMMERCIAL

## 2018-11-26 VITALS — BODY MASS INDEX: 43.18 KG/M2 | WEIGHT: 315 LBS

## 2018-11-26 DIAGNOSIS — E66.01 MORBID OBESITY (H): ICD-10-CM

## 2018-11-26 PROCEDURE — 97803 MED NUTRITION INDIV SUBSEQ: CPT | Performed by: DIETITIAN, REGISTERED

## 2018-11-26 NOTE — PROGRESS NOTES
"PRE SURGICAL WEIGHT LOSS NUTRITION APPOINTMENT    Aguilar Renteria  1985  male  [id number]  33 year old    ASSESSMENT    Desired Surgical Procedure: undecided     REASON FOR VISIT:  Aguilar Renteria is a 33 year old year old male presents today for a pre-surgical weight loss follow-up appointment. Patient accompanied by self.    DIAGNOSIS:  Weight Status Obesity Grade III BMI >40    ANTHROPOMETRICS:  Height: 6'3\"  Initial Weight: 345 lbs     Weight last visit: 340 lbs    Current weight: (!) 156.7 kg (345 lb 8 oz)  BMI: 43.1  kg/(m^2).    VITAMINS AND MINERALS:  1 Multivitamin with Minerals (at night)  600 mg Calcium with Vitamin D (morning and lunch) citrate   2000 International units Vitamin D (at night)     NUTRITION HISTORY:  Breakfast: scrambled eggs and occasionally toast   Lunch: chicken, egg, rice, edamame, veggie packet + water + apple   Supper: pork chops + root vegetables (sweet potatoes and onions) + cranberry sauce; chicken soup (Patient's wife plans meals for the week)  Snacks: popcorn (Skinny Pop)  Fluids Consumed: Water  Eating slower: Yes  Chewing foods thoroughly: Yes  Take 20-30 minutes to consume each meal: Yes  Fluids and meals separate by at least 30 minutes: Yes  Carbonation: none  Caffeine: none  Additional Information: Patient states had busy month as patient and his wife were helping her parents due to sister's passing.  Patient feels he overate during the Thanksgiving holiday.  Patient with work changes so is unable to exercise during lunch which he had planned.  Determined exercise plan for patient.     PHYSICAL ACTIVITY:  Type: no consistent exercise-has gym membership    DIAGNOSIS:  Previous Nutrition Diagnosis: Obesity related to long history of self- monitoring deficit and excessive energy intake evidenced by BMI of 42.6 kg/m2  No change, modified below    Previous goals:   Exercise 2-3 times per week-not met  Eliminate caffeine-met  Start vitamins-met     Current Nutrition Diagnosis: " Obesity related to long history of self-monitoring deficit and excessive energy intake as evidenced by BMI of 43.1 kg/m2.    INTERVENTION:  Nutrition Prescription: Recommended energy/nutrient modification.    GOALS:  Exercise Tuesday, Thursday and Saturday  Continue following pre surgery diet guidelines    Intervention:  - Discussed progress towards previous goals.  - Reinforced importance of making behavior changes in preparation for bariatric surgery.   - Assessed learning needs and learning preferences    NUTRITION MONITORING AND EVALUATION:  Anticipated compliance: fair-good  Patient demonstrated good understanding.     Follow up: Continue to monitor patient closely regarding weight loss and diet.  # of visits needed: 1 (to meet weight loss goal)  Cleared by RD: No     TIME SPENT WITH PATIENT: 25 minutes    Monique Cunningham, RD, LD  [facility] Outpatient Dietitian  [phone] (office phone)

## 2018-12-21 ENCOUNTER — OFFICE VISIT (OUTPATIENT)
Dept: SURGERY | Facility: CLINIC | Age: 33
End: 2018-12-21
Payer: COMMERCIAL

## 2018-12-21 VITALS — WEIGHT: 315 LBS | BODY MASS INDEX: 43.5 KG/M2

## 2018-12-21 DIAGNOSIS — E66.01 MORBID OBESITY (H): ICD-10-CM

## 2018-12-21 PROCEDURE — 97803 MED NUTRITION INDIV SUBSEQ: CPT | Performed by: DIETITIAN, REGISTERED

## 2018-12-21 NOTE — PROGRESS NOTES
"PRE SURGICAL WEIGHT LOSS NUTRITION APPOINTMENT    Aguilar Renteria  1985  male  [id number]  33 year old    ASSESSMENT    Desired Surgical Procedure: undecided    REASON FOR VISIT:  Aguilar Renteria is a 33 year old year old male presents today for a pre-surgical weight loss follow-up appointment. Patient accompanied by self.    DIAGNOSIS:  Weight Status Obesity Grade III BMI >40    ANTHROPOMETRICS:  Height: 6'3\"  Initial Weight: 345 lbs     Weight last visit: 345 lbs    Current weight: (!) 157.9 kg (348 lb)  BMI: 43.5 kg/(m^2).    VITAMINS AND MINERALS:  1 Multivitamin with Minerals  600 mg Calcium with Vitamin D 2 times per day  2000 International units Vitamin D    NUTRITION HISTORY:  Breakfast: yogurt and granola (1/4-1/3 cup)  Lunch: chicken and rice packet; frozen entree (Mexican) no cheese, rice bowls  Supper: chicken soup (homemade) wife makes homemade meals; [facility] restaurant; holiday parties   Snacks: holiday cookies; egg nog   Fluids Consumed: Water and milk, orange juice   Eating slower: Yes  Chewing foods thoroughly: Yes  Take 20-30 minutes to consume each meal: Yes  Fluids and meals separate by at least 30 minutes: No  Carbonation: Coke  Caffeine: Cristopher and Coke at [place] party  Additional Information: Patient struggling with working in exercise.  Patient concerned if he is unable to make behavior changes prior to surgery that he won't have long term success.  Patient unable to lose weight this month due to holiday eating and activities.  Patient's wife recently found out she require surgery which is happening today.  Patient hopes to have surgery in March when his wife will be on spring break from school.      PHYSICAL ACTIVITY:  Type: none    DIAGNOSIS:  Previous Nutrition Diagnosis: Obesity related to long history of self- monitoring deficit and excessive energy intake evidenced by BMI of 43.1 kg/m2  No change, modified below    Previous goals:   Exercise Tuesday, Thursday and Saturday  Continue following " pre surgery diet guidelines     Current Nutrition Diagnosis: Obesity related to long history of self-monitoring deficit and excessive energy intake as evidenced by BMI of 43.5 kg/m2.    INTERVENTION:  Nutrition Prescription: Recommended energy/nutrient modification.    GOALS:  Schedule 3 exercise sessions per week in calendar  Avoid liquids 30 minutes before, during and after meals  Reduce portion sizes at meals    Intervention:  - Discussed progress towards previous goals.  - Reinforced importance of making behavior changes in preparation for bariatric surgery.   - Assessed learning needs and learning preferences    NUTRITION MONITORING AND EVALUATION:  Anticipated compliance: fair-good  Patient demonstrated good understanding.   *Patient has met pre bariatric surgery diet requirements    Follow up: Continue to monitor patient closely regarding weight loss and diet.  # of visits needed: 1-2 (until meets weight loss goal)  Cleared by RD: No     TIME SPENT WITH PATIENT: 30 minutes    Monique Cunningham, RD, LD  [facility] Outpatient Dietitian  [phone] (office phone)

## 2019-01-25 ENCOUNTER — OFFICE VISIT (OUTPATIENT)
Dept: SURGERY | Facility: CLINIC | Age: 34
End: 2019-01-25
Payer: COMMERCIAL

## 2019-01-25 VITALS — BODY MASS INDEX: 42.32 KG/M2 | WEIGHT: 315 LBS

## 2019-01-25 DIAGNOSIS — E66.01 MORBID OBESITY (H): ICD-10-CM

## 2019-01-25 PROCEDURE — 97803 MED NUTRITION INDIV SUBSEQ: CPT | Performed by: DIETITIAN, REGISTERED

## 2019-02-07 ENCOUNTER — OFFICE VISIT (OUTPATIENT)
Dept: SURGERY | Facility: CLINIC | Age: 34
End: 2019-02-07
Payer: COMMERCIAL

## 2019-02-07 VITALS
HEIGHT: 75 IN | OXYGEN SATURATION: 98 % | DIASTOLIC BLOOD PRESSURE: 82 MMHG | WEIGHT: 315 LBS | HEART RATE: 80 BPM | BODY MASS INDEX: 39.17 KG/M2 | SYSTOLIC BLOOD PRESSURE: 130 MMHG

## 2019-02-07 DIAGNOSIS — E66.01 MORBID OBESITY (H): Primary | ICD-10-CM

## 2019-02-07 PROCEDURE — 99215 OFFICE O/P EST HI 40 MIN: CPT | Performed by: SURGERY

## 2019-02-07 ASSESSMENT — MIFFLIN-ST. JEOR: SCORE: 2586.48

## 2019-02-07 NOTE — PROGRESS NOTES
"Dear Jefferson Whyte,      Referring provider: No flowsheet data found.  I was asked to see the patient regarding obesity by the referring provider above.    I had the pleasure of meeting with your patient Aguilar Renteria in our weight loss surgery office.  This patient is a 33 year old male who has been undergoing our thorough preoperative screening process in anticipation of potential bariatric surgery.    At initial evaluation we recorded Aguilar Renteria's Initial BMI: 43.12 and Initial Weight: 156.5 kg (345 lb).  The patient has been unsuccessful with other methods of permanent weight loss and suffers from multiple weight related medical conditions.  Due to lack of success in achieving weight loss through other methods, he is interested in undergoing bariatric surgery.    PREVIOUS WEIGHT LOSS ATTEMPTS:     7/31/2018   I have tried the following methods to lose weight Watching portions or calories, Exercise, Weight Watchers       CO-MORBIDITIES OF OBESITY INCLUDE:     7/31/2018   I have the following co-morbidities associated with obesity: Sleep Apnea, Weight Bearing Joint Pain   Do you use a CPAP? Yes       VITALS:  /82 (BP Location: Right arm, Patient Position: Sitting, Cuff Size: Adult Large)   Pulse 80   Ht 1.905 m (6' 3\")   Wt (!) 155.1 kg (341 lb 14.4 oz)   SpO2 98%   BMI 42.73 kg/m      PE:  GENERAL: Alert and oriented x3. NAD  HEENT exam: Sclerae not icteric. Hearing good. Head normocephalic and atraumatic.   CARDIOVASCULAR: No JVD  RESPIRATORY: Breathing unlabored  GASTROINTESTINAL: Obese  LOWER EXTREMITIES: no deformities  MUSCULOSKELETAL: Normal gait, Moves all 4 extremities equal and strong  NEUROLOGIC: no gross defect  SKIN: warm and dry to touch     In summary, he has undergone an appropriate medical evaluation, dietitian evaluation, as well as psychologic screening. The patient appears to be an appropriate candidate for bariatric surgery.    In the office today, I discussed the laparoscopic " gastric sleeve surgery.  Risks, benefits and anticipated outcomes were outlined including the risk of death, staple line leak (1-2%), PE, DVT, ulcer, worsening GERD, N/V, stricture, hernia, wound infection, weight regain, and vitamin deficiencies. This patient has a good chance of sustaining permanent weight loss due to this procedure.  This should also allow improvement if not resolution of his/her weight related medical conditions.    At present we are going to present your patient's file for prior authorization to insurance.  Pending prior authorization, I anticipate a surgical date in the near future.  We will keep you updated on any progress.  If you have questions regarding care please feel free to contact me.  Total time spent in the clinic was 60 minutes with greater than 50% in face-to-face consultation.        Sincerely,    Ren Guerrero    Please route or send letter to:  Primary Care Provider (PCP) and Referring Provider

## 2019-02-25 ENCOUNTER — TELEPHONE (OUTPATIENT)
Dept: SURGERY | Facility: CLINIC | Age: 34
End: 2019-02-25

## 2019-02-25 NOTE — TELEPHONE ENCOUNTER
Reason for call:  Other   Patient called regarding (reason for call): call back  Additional comments: Patient would like a call back regarding his insurance status and wether he has been approved for surgery. He hasn't heard anything in two weeks and was wondering.    Phone number to reach patient:  Home number on file [phone] (home)    Best Time:  Anytime    Can we leave a detailed message on this number?  YES

## 2019-02-25 NOTE — TELEPHONE ENCOUNTER
Called patient back and let him know I submitted prior auth on 2/14/19. I will follow up with insurance if I do not hear back by the end of the week.

## 2019-03-18 ENCOUNTER — OFFICE VISIT (OUTPATIENT)
Dept: INTERNAL MEDICINE | Facility: CLINIC | Age: 34
End: 2019-03-18
Payer: COMMERCIAL

## 2019-03-18 VITALS
OXYGEN SATURATION: 95 % | BODY MASS INDEX: 39.17 KG/M2 | RESPIRATION RATE: 14 BRPM | DIASTOLIC BLOOD PRESSURE: 78 MMHG | HEIGHT: 75 IN | WEIGHT: 315 LBS | HEART RATE: 95 BPM | SYSTOLIC BLOOD PRESSURE: 126 MMHG | TEMPERATURE: 98.1 F

## 2019-03-18 DIAGNOSIS — F32.9 MAJOR DEPRESSIVE DISORDER WITH SINGLE EPISODE, REMISSION STATUS UNSPECIFIED: ICD-10-CM

## 2019-03-18 DIAGNOSIS — G47.33 OSA ON CPAP: ICD-10-CM

## 2019-03-18 DIAGNOSIS — E66.01 MORBID OBESITY (H): ICD-10-CM

## 2019-03-18 DIAGNOSIS — Z01.818 PREOP GENERAL PHYSICAL EXAM: Primary | ICD-10-CM

## 2019-03-18 LAB
CREAT SERPL-MCNC: 1.19 MG/DL (ref 0.66–1.25)
GFR SERPL CREATININE-BSD FRML MDRD: 80 ML/MIN/{1.73_M2}
GLUCOSE SERPL-MCNC: 135 MG/DL (ref 70–99)
HGB BLD-MCNC: 15.9 G/DL (ref 13.3–17.7)
PLATELET # BLD AUTO: 281 10E9/L (ref 150–450)
POTASSIUM SERPL-SCNC: 4 MMOL/L (ref 3.4–5.3)

## 2019-03-18 PROCEDURE — 84132 ASSAY OF SERUM POTASSIUM: CPT | Performed by: INTERNAL MEDICINE

## 2019-03-18 PROCEDURE — 99215 OFFICE O/P EST HI 40 MIN: CPT | Mod: 25 | Performed by: INTERNAL MEDICINE

## 2019-03-18 PROCEDURE — 85049 AUTOMATED PLATELET COUNT: CPT | Performed by: INTERNAL MEDICINE

## 2019-03-18 PROCEDURE — 82947 ASSAY GLUCOSE BLOOD QUANT: CPT | Performed by: INTERNAL MEDICINE

## 2019-03-18 PROCEDURE — 82565 ASSAY OF CREATININE: CPT | Performed by: INTERNAL MEDICINE

## 2019-03-18 PROCEDURE — 85018 HEMOGLOBIN: CPT | Performed by: INTERNAL MEDICINE

## 2019-03-18 PROCEDURE — 90715 TDAP VACCINE 7 YRS/> IM: CPT | Performed by: INTERNAL MEDICINE

## 2019-03-18 PROCEDURE — 36415 COLL VENOUS BLD VENIPUNCTURE: CPT | Performed by: INTERNAL MEDICINE

## 2019-03-18 PROCEDURE — 90471 IMMUNIZATION ADMIN: CPT | Performed by: INTERNAL MEDICINE

## 2019-03-18 ASSESSMENT — PATIENT HEALTH QUESTIONNAIRE - PHQ9: SUM OF ALL RESPONSES TO PHQ QUESTIONS 1-9: 2

## 2019-03-18 ASSESSMENT — MIFFLIN-ST. JEOR: SCORE: 2538.85

## 2019-03-18 NOTE — NURSING NOTE
Prior to injection, verified patient identity using patient's name and date of birth.  Due to injection administration, patient instructed to remain in clinic for 15 minutes  afterwards, and to report any adverse reaction to me immediately.    Screening Questionnaire for Adult Immunization    Are you sick today?   No   Do you have allergies to medications, food, a vaccine component or latex?   No   Have you ever had a serious reaction after receiving a vaccination?   No   Do you have a long-term health problem with heart disease, lung disease, asthma, kidney disease, metabolic disease (e.g. diabetes), anemia, or other blood disorder?   No   Do you have cancer, leukemia, HIV/AIDS, or any other immune system problem?   No   In the past 3 months, have you taken medications that affect  your immune system, such as prednisone, other steroids, or anticancer drugs; drugs for the treatment of rheumatoid arthritis, Crohn s disease, or psoriasis; or have you had radiation treatments?   No   Have you had a seizure, or a brain or other nervous system problem?   No   During the past year, have you received a transfusion of blood or blood     products, or been given immune (gamma) globulin or antiviral drug?   No   For women: Are you pregnant or is there a chance you could become        pregnant during the next month?   No   Have you received any vaccinations in the past 4 weeks?   No     Immunization questionnaire answers were all negative.        Per orders of Dr. Yanes, injection of TDAP given by Hilaria Washburn. Patient instructed to remain in clinic for 15 minutes afterwards, and to report any adverse reaction to me immediately.       Screening performed by Hilaria Washburn on 3/18/2019 at 10:21 AM.

## 2019-03-18 NOTE — PROGRESS NOTES
[facility]  [address] [address]  [phone]  Dept: [phone]    PRE-OP EVALUATION:  Today's date: 3/18/2019    Aguilar Renteria (: 1985) presents for pre-operative evaluation assessment as requested by Dr. Guerrero.  He requires evaluation and anesthesia risk assessment prior to undergoing surgery/procedure for treatment of morbid obesity .    Proposed Surgery/ Procedure: Combined laparoscopic gastric sleeve, possible cholecystectomy   Date of Surgery/ Procedure: 3/26/19  Time of Surgery/ Procedure: 11:15 am  Hospital/Surgical Facility: [facility]  Primary Physician: Jefferson Yanes  Type of Anesthesia Anticipated: General    Patient has a Health Care Directive or Living Will:  NO      HPI:     HPI related to upcoming procedure:  He requires evaluation and anesthesia risk assessment prior to undergoing surgery/procedure for treatment of morbid obesity .    Proposed Surgery/ Procedure: Combined laparoscopic gastric sleeve, possible cholecystectomy   Date of Surgery/ Procedure: 3/26/19    See problem list for active medical problems.  Problems all longstanding and stable, except as noted/documented.  See ROS for pertinent symptoms related to these conditions.                                                                                                                                                          .    MEDICAL HISTORY:     Patient Active Problem List    Diagnosis Date Noted     Prediabetes 2018     Priority: Medium     Morbid obesity (H) 2018     Priority: Medium     LIZANDRO on CPAP 2018     Priority: Medium     Major depressive disorder with single episode, remission status unspecified 2017     Priority: Medium     Chronic bilateral low back pain without sciatica 2016     Priority: Medium     CARDIOVASCULAR SCREENING; LDL GOAL LESS THAN 160 2016     Priority: Medium     Anxiety state 2014     Priority: Medium      "Problem list name updated by automated process. Provider to review        Past Medical History:   Diagnosis Date     Anxiety May 2014    Took Citalapram for anxiety, no longer on medication.     Major depressive disorder with single episode, remission status unspecified      Past Surgical History:   Procedure Laterality Date     ENT SURGERY      TM repair     ORTHOPEDIC SURGERY  09/01/2017    Knee othroscopy     Current Outpatient Medications   Medication Sig Dispense Refill     cetirizine (ZYRTEC) 10 MG tablet Take 10 mg by mouth daily       OTC products: None, except as noted above    No Known Allergies   Latex Allergy: NO    Social History     Tobacco Use     Smoking status: Never Smoker     Smokeless tobacco: Never Used   Substance Use Topics     Alcohol use: Yes     Comment: occas     History   Drug Use No       REVIEW OF SYSTEMS:   CONSTITUTIONAL: NEGATIVE for fever, chills, + change in weight  INTEGUMENTARY/SKIN: NEGATIVE for worrisome rashes, moles or lesions  ENT/MOUTH: NEGATIVE for ear, mouth and throat problems  RESP: NEGATIVE for significant cough or SOB  CV: NEGATIVE for chest pain, palpitations or peripheral edema  GI: NEGATIVE for nausea, abdominal pain, heartburn, or change in bowel habits  : NEGATIVE for frequency, dysuria, or hematuria  MUSCULOSKELETAL: NEGATIVE for significant arthralgias or myalgia  HEME: NEGATIVE for bleeding problems  PSYCHIATRIC: NEGATIVE for changes in mood or affect    EXAM:   /78   Pulse 95   Temp 98.1  F (36.7  C) (Oral)   Resp 14   Ht 1.905 m (6' 3\")   Wt (!) 150.3 kg (331 lb 6.4 oz)   SpO2 95%   BMI 41.42 kg/m      GENERAL APPEARANCE: alert and no distress     EYES: EOMI,  PERRL     HENT: ear canals and TM's normal and nose and mouth without ulcers or lesions     NECK: no adenopathy, no asymmetry, masses, or scars and thyroid normal to palpation     RESP: lungs clear to auscultation - no rales, rhonchi or wheezes     CV: regular rates and rhythm, normal S1 " S2, no S3 or S4 and no click or rub     ABDOMEN:  soft, nontender, no HSM or masses and bowel sounds normal     ABDOMEN: obese     MS: extremities normal- no gross deformities noted     NEURO: No focal changes     PSYCH: mentation appears normal and affect normal/bright.      DIAGNOSTICS:     EKG: not perrfomed due to age and lower risk profile  Labs Drawn and in Process:   Unresulted Labs Ordered in the Past 30 Days of this Admission     No orders found from 1/17/2019 to 3/19/2019.          Recent Labs   Lab Test 07/31/18  1250 08/28/17  1218 12/28/16  0715   HGB 15.5 15.9 16.3    230 201     --  141   POTASSIUM 3.7 4.3 4.1   CR 1.36*  --  1.10   A1C 5.8*  --   --         IMPRESSION:   Reason for surgery/procedure:  He requires evaluation and anesthesia risk assessment prior to undergoing surgery/procedure for treatment of morbid obesity .    Proposed Surgery/ Procedure: Combined laparoscopic gastric sleeve, possible cholecystectomy   Date of Surgery/ Procedure: 3/26/19    The proposed surgical procedure is considered low/mild risk.    REVISED CARDIAC RISK INDEX  The patient has the following serious cardiovascular risks for perioperative complications such as (MI, PE, VFib and 3  AV Block):  No serious cardiac risks  INTERPRETATION: 1 risks: Class II (low risk - 0.9% complication rate)    The patient has the following additional risks for perioperative complications:  No identified additional risks  Morbid obesity      ICD-10-CM    1. Preop general physical exam Z01.818 Potassium     Glucose     Hemoglobin     Platelet count   2. Morbid obesity (H) E66.01 TDAP VACCINE (ADACEL)     VACCINE ADMINISTRATION, INITIAL   3. Major depressive disorder with single episode, remission status unspecified F32.9    4. LIZANDRO on CPAP G47.33     Z99.89        RECOMMENDATIONS:     --Consult hospital rounder / IM to assist post-op medical management    Cardiovascular Risk  Performs 4 METs exercise without symptoms (Light  housework (dusting, washing dishes), Climb a flight of stairs and Walk on level ground at 15 minutes per mile (4 miles/hour)) .     Obstructive Sleep Apnea (or suspected sleep apnea)  Patient is to bring their home CPAP with them on the day of surgery    --Patient is to take all scheduled medications on the day of surgery EXCEPT for modifications listed below.    Anticoagulant or Antiplatelet Medication Use  NSAIDS/MVI, if taking:  Stop 7 days prior to surgery if taking.    APPROVAL GIVEN to proceed with proposed procedure, without further diagnostic evaluation     Signed Electronically by: Jefferson Yanes MD    Copy of this evaluation report is provided to requesting physician, Dr. Guerrero.    [facility] Preop Guidelines    Revised Cardiac Risk Index

## 2019-03-21 ENCOUNTER — ALLIED HEALTH/NURSE VISIT (OUTPATIENT)
Dept: SURGERY | Facility: CLINIC | Age: 34
End: 2019-03-21
Payer: COMMERCIAL

## 2019-03-25 ENCOUNTER — TELEPHONE (OUTPATIENT)
Dept: MEDSURG UNIT | Facility: CLINIC | Age: 34
End: 2019-03-25

## 2019-03-25 NOTE — TELEPHONE ENCOUNTER
Patient's wife, Esperanza, called wanting to know what patient can eat today.  Patient is having gastric sleeve tomorrow 3/26/19.  Noted that patient signed consent to communicate with his wife 10/1/14.    Informed patient's wife that patient can eat clear liquids only today -anything he can see througl - jello, broth juice without pulp.    Patient's wife verbalized understanding.  To call if questions/concerns.  Keiko Moran, MS, RD, RN

## 2019-03-26 ENCOUNTER — HOSPITAL ENCOUNTER (INPATIENT)
Facility: CLINIC | Age: 34
LOS: 2 days | Discharge: HOME | DRG: 621 | End: 2019-03-28
Attending: SURGERY | Admitting: SURGERY
Payer: COMMERCIAL

## 2019-03-26 ENCOUNTER — APPOINTMENT (OUTPATIENT)
Dept: SURGERY | Facility: PHYSICIAN GROUP | Age: 34
End: 2019-03-26
Payer: COMMERCIAL

## 2019-03-26 ENCOUNTER — ANESTHESIA (OUTPATIENT)
Dept: SURGERY | Facility: CLINIC | Age: 34
DRG: 621 | End: 2019-03-26
Payer: COMMERCIAL

## 2019-03-26 ENCOUNTER — ANESTHESIA EVENT (OUTPATIENT)
Dept: SURGERY | Facility: CLINIC | Age: 34
DRG: 621 | End: 2019-03-26
Payer: COMMERCIAL

## 2019-03-26 DIAGNOSIS — Z98.84 BARIATRIC SURGERY STATUS: ICD-10-CM

## 2019-03-26 DIAGNOSIS — R11.2 PONV (POSTOPERATIVE NAUSEA AND VOMITING): Primary | ICD-10-CM

## 2019-03-26 DIAGNOSIS — E66.01 MORBID OBESITY WITH BMI OF 40.0-44.9, ADULT (H): ICD-10-CM

## 2019-03-26 DIAGNOSIS — Z98.890 PONV (POSTOPERATIVE NAUSEA AND VOMITING): Primary | ICD-10-CM

## 2019-03-26 LAB
CREAT SERPL-MCNC: 1.05 MG/DL (ref 0.66–1.25)
GFR SERPL CREATININE-BSD FRML MDRD: >90 ML/MIN/{1.73_M2}
GLUCOSE BLDC GLUCOMTR-MCNC: 136 MG/DL (ref 70–99)
PLATELET # BLD AUTO: 205 10E9/L (ref 150–450)

## 2019-03-26 PROCEDURE — 36000093 ZZH SURGERY LEVEL 4 1ST 30 MIN: Performed by: SURGERY

## 2019-03-26 PROCEDURE — 93005 ELECTROCARDIOGRAM TRACING: CPT

## 2019-03-26 PROCEDURE — 25000132 ZZH RX MED GY IP 250 OP 250 PS 637: Performed by: PHYSICIAN ASSISTANT

## 2019-03-26 PROCEDURE — 88300 SURGICAL PATH GROSS: CPT | Performed by: SURGERY

## 2019-03-26 PROCEDURE — 40000170 ZZH STATISTIC PRE-PROCEDURE ASSESSMENT II: Performed by: SURGERY

## 2019-03-26 PROCEDURE — 82565 ASSAY OF CREATININE: CPT | Performed by: PHYSICIAN ASSISTANT

## 2019-03-26 PROCEDURE — 25000566 ZZH SEVOFLURANE, EA 15 MIN: Performed by: SURGERY

## 2019-03-26 PROCEDURE — 37000008 ZZH ANESTHESIA TECHNICAL FEE, 1ST 30 MIN: Performed by: SURGERY

## 2019-03-26 PROCEDURE — 43775 LAP SLEEVE GASTRECTOMY: CPT | Performed by: SURGERY

## 2019-03-26 PROCEDURE — 25800030 ZZH RX IP 258 OP 636: Performed by: NURSE ANESTHETIST, CERTIFIED REGISTERED

## 2019-03-26 PROCEDURE — 25000128 H RX IP 250 OP 636: Performed by: SURGERY

## 2019-03-26 PROCEDURE — 25000125 ZZHC RX 250: Performed by: ANESTHESIOLOGY

## 2019-03-26 PROCEDURE — 25800030 ZZH RX IP 258 OP 636: Performed by: PHYSICIAN ASSISTANT

## 2019-03-26 PROCEDURE — 25000128 H RX IP 250 OP 636: Performed by: PHYSICIAN ASSISTANT

## 2019-03-26 PROCEDURE — 0DB64Z3 EXCISION OF STOMACH, PERCUTANEOUS ENDOSCOPIC APPROACH, VERTICAL: ICD-10-PCS | Performed by: SURGERY

## 2019-03-26 PROCEDURE — 93010 ELECTROCARDIOGRAM REPORT: CPT | Performed by: INTERNAL MEDICINE

## 2019-03-26 PROCEDURE — 43775 LAP SLEEVE GASTRECTOMY: CPT | Mod: AS | Performed by: PHYSICIAN ASSISTANT

## 2019-03-26 PROCEDURE — 36415 COLL VENOUS BLD VENIPUNCTURE: CPT | Performed by: PHYSICIAN ASSISTANT

## 2019-03-26 PROCEDURE — 25000125 ZZHC RX 250: Performed by: NURSE ANESTHETIST, CERTIFIED REGISTERED

## 2019-03-26 PROCEDURE — 37000009 ZZH ANESTHESIA TECHNICAL FEE, EACH ADDTL 15 MIN: Performed by: SURGERY

## 2019-03-26 PROCEDURE — 25000125 ZZHC RX 250: Performed by: SURGERY

## 2019-03-26 PROCEDURE — 25000128 H RX IP 250 OP 636: Performed by: NURSE ANESTHETIST, CERTIFIED REGISTERED

## 2019-03-26 PROCEDURE — 00000146 ZZHCL STATISTIC GLUCOSE BY METER IP

## 2019-03-26 PROCEDURE — 27210995 ZZH RX 272: Performed by: SURGERY

## 2019-03-26 PROCEDURE — 25800025 ZZH RX 258: Performed by: SURGERY

## 2019-03-26 PROCEDURE — 25000125 ZZHC RX 250: Performed by: PHYSICIAN ASSISTANT

## 2019-03-26 PROCEDURE — 88300 SURGICAL PATH GROSS: CPT | Mod: 26 | Performed by: SURGERY

## 2019-03-26 PROCEDURE — 25800030 ZZH RX IP 258 OP 636: Performed by: ANESTHESIOLOGY

## 2019-03-26 PROCEDURE — 25800030 ZZH RX IP 258 OP 636: Performed by: SURGERY

## 2019-03-26 PROCEDURE — 36000063 ZZH SURGERY LEVEL 4 EA 15 ADDTL MIN: Performed by: SURGERY

## 2019-03-26 PROCEDURE — 27210794 ZZH OR GENERAL SUPPLY STERILE: Performed by: SURGERY

## 2019-03-26 PROCEDURE — 12000000 ZZH R&B MED SURG/OB

## 2019-03-26 PROCEDURE — 85049 AUTOMATED PLATELET COUNT: CPT | Performed by: PHYSICIAN ASSISTANT

## 2019-03-26 PROCEDURE — 71000012 ZZH RECOVERY PHASE 1 LEVEL 1 FIRST HR: Performed by: SURGERY

## 2019-03-26 PROCEDURE — 71000013 ZZH RECOVERY PHASE 1 LEVEL 1 EA ADDTL HR: Performed by: SURGERY

## 2019-03-26 PROCEDURE — 25000128 H RX IP 250 OP 636: Performed by: ANESTHESIOLOGY

## 2019-03-26 RX ORDER — PROPOFOL 10 MG/ML
INJECTION, EMULSION INTRAVENOUS PRN
Status: DISCONTINUED | OUTPATIENT
Start: 2019-03-26 | End: 2019-03-26

## 2019-03-26 RX ORDER — OXYCODONE HYDROCHLORIDE 5 MG/1
5-10 TABLET ORAL
Status: DISCONTINUED | OUTPATIENT
Start: 2019-03-26 | End: 2019-03-28 | Stop reason: HOSPADM

## 2019-03-26 RX ORDER — CETIRIZINE HYDROCHLORIDE 10 MG/1
10 TABLET ORAL DAILY
Status: DISCONTINUED | OUTPATIENT
Start: 2019-03-27 | End: 2019-03-28 | Stop reason: HOSPADM

## 2019-03-26 RX ORDER — ONDANSETRON 2 MG/ML
INJECTION INTRAMUSCULAR; INTRAVENOUS PRN
Status: DISCONTINUED | OUTPATIENT
Start: 2019-03-26 | End: 2019-03-26

## 2019-03-26 RX ORDER — NEOSTIGMINE METHYLSULFATE 1 MG/ML
VIAL (ML) INJECTION PRN
Status: DISCONTINUED | OUTPATIENT
Start: 2019-03-26 | End: 2019-03-26

## 2019-03-26 RX ORDER — SCOLOPAMINE TRANSDERMAL SYSTEM 1 MG/1
1 PATCH, EXTENDED RELEASE TRANSDERMAL
Status: DISCONTINUED | OUTPATIENT
Start: 2019-03-26 | End: 2019-03-28 | Stop reason: HOSPADM

## 2019-03-26 RX ORDER — SODIUM CHLORIDE, SODIUM LACTATE, POTASSIUM CHLORIDE, CALCIUM CHLORIDE 600; 310; 30; 20 MG/100ML; MG/100ML; MG/100ML; MG/100ML
INJECTION, SOLUTION INTRAVENOUS CONTINUOUS
Status: DISCONTINUED | OUTPATIENT
Start: 2019-03-26 | End: 2019-03-28 | Stop reason: HOSPADM

## 2019-03-26 RX ORDER — SODIUM CHLORIDE, SODIUM LACTATE, POTASSIUM CHLORIDE, CALCIUM CHLORIDE 600; 310; 30; 20 MG/100ML; MG/100ML; MG/100ML; MG/100ML
INJECTION, SOLUTION INTRAVENOUS CONTINUOUS
Status: DISCONTINUED | OUTPATIENT
Start: 2019-03-26 | End: 2019-03-26 | Stop reason: HOSPADM

## 2019-03-26 RX ORDER — KETOROLAC TROMETHAMINE 30 MG/ML
30 INJECTION, SOLUTION INTRAMUSCULAR; INTRAVENOUS EVERY 6 HOURS
Status: DISPENSED | OUTPATIENT
Start: 2019-03-26 | End: 2019-03-27

## 2019-03-26 RX ORDER — ONDANSETRON 2 MG/ML
4 INJECTION INTRAMUSCULAR; INTRAVENOUS EVERY 6 HOURS PRN
Status: DISCONTINUED | OUTPATIENT
Start: 2019-03-26 | End: 2019-03-28 | Stop reason: HOSPADM

## 2019-03-26 RX ORDER — ACETAMINOPHEN 325 MG/1
975 TABLET ORAL EVERY 8 HOURS
Status: DISCONTINUED | OUTPATIENT
Start: 2019-03-26 | End: 2019-03-28 | Stop reason: HOSPADM

## 2019-03-26 RX ORDER — FENTANYL CITRATE 50 UG/ML
INJECTION, SOLUTION INTRAMUSCULAR; INTRAVENOUS PRN
Status: DISCONTINUED | OUTPATIENT
Start: 2019-03-26 | End: 2019-03-26

## 2019-03-26 RX ORDER — LIDOCAINE HYDROCHLORIDE 20 MG/ML
INJECTION, SOLUTION INFILTRATION; PERINEURAL PRN
Status: DISCONTINUED | OUTPATIENT
Start: 2019-03-26 | End: 2019-03-26

## 2019-03-26 RX ORDER — MORPHINE SULFATE 2 MG/ML
2-4 INJECTION, SOLUTION INTRAMUSCULAR; INTRAVENOUS EVERY 4 HOURS PRN
Status: DISCONTINUED | OUTPATIENT
Start: 2019-03-26 | End: 2019-03-28 | Stop reason: HOSPADM

## 2019-03-26 RX ORDER — VECURONIUM BROMIDE 1 MG/ML
INJECTION, POWDER, LYOPHILIZED, FOR SOLUTION INTRAVENOUS PRN
Status: DISCONTINUED | OUTPATIENT
Start: 2019-03-26 | End: 2019-03-26

## 2019-03-26 RX ORDER — NALOXONE HYDROCHLORIDE 0.4 MG/ML
.1-.4 INJECTION, SOLUTION INTRAMUSCULAR; INTRAVENOUS; SUBCUTANEOUS
Status: DISCONTINUED | OUTPATIENT
Start: 2019-03-26 | End: 2019-03-28 | Stop reason: HOSPADM

## 2019-03-26 RX ORDER — GLYCOPYRROLATE 0.2 MG/ML
INJECTION, SOLUTION INTRAMUSCULAR; INTRAVENOUS PRN
Status: DISCONTINUED | OUTPATIENT
Start: 2019-03-26 | End: 2019-03-26

## 2019-03-26 RX ORDER — FENTANYL CITRATE 50 UG/ML
25-50 INJECTION, SOLUTION INTRAMUSCULAR; INTRAVENOUS EVERY 5 MIN PRN
Status: DISCONTINUED | OUTPATIENT
Start: 2019-03-26 | End: 2019-03-26 | Stop reason: HOSPADM

## 2019-03-26 RX ORDER — DIPHENHYDRAMINE HYDROCHLORIDE 50 MG/ML
25 INJECTION INTRAMUSCULAR; INTRAVENOUS EVERY 6 HOURS PRN
Status: DISCONTINUED | OUTPATIENT
Start: 2019-03-26 | End: 2019-03-28 | Stop reason: HOSPADM

## 2019-03-26 RX ORDER — PROCHLORPERAZINE MALEATE 10 MG
10 TABLET ORAL EVERY 6 HOURS PRN
Status: DISCONTINUED | OUTPATIENT
Start: 2019-03-26 | End: 2019-03-28 | Stop reason: HOSPADM

## 2019-03-26 RX ORDER — ONDANSETRON 2 MG/ML
4 INJECTION INTRAMUSCULAR; INTRAVENOUS EVERY 30 MIN PRN
Status: DISCONTINUED | OUTPATIENT
Start: 2019-03-26 | End: 2019-03-26 | Stop reason: HOSPADM

## 2019-03-26 RX ORDER — DEXAMETHASONE SODIUM PHOSPHATE 4 MG/ML
INJECTION, SOLUTION INTRA-ARTICULAR; INTRALESIONAL; INTRAMUSCULAR; INTRAVENOUS; SOFT TISSUE PRN
Status: DISCONTINUED | OUTPATIENT
Start: 2019-03-26 | End: 2019-03-26

## 2019-03-26 RX ORDER — CEFAZOLIN SODIUM IN 0.9 % NACL 3 G/100 ML
3 INTRAVENOUS SOLUTION, PIGGYBACK (ML) INTRAVENOUS
Status: COMPLETED | OUTPATIENT
Start: 2019-03-26 | End: 2019-03-26

## 2019-03-26 RX ORDER — LABETALOL 20 MG/4 ML (5 MG/ML) INTRAVENOUS SYRINGE
10
Status: DISCONTINUED | OUTPATIENT
Start: 2019-03-26 | End: 2019-03-26 | Stop reason: HOSPADM

## 2019-03-26 RX ORDER — HYDROMORPHONE HYDROCHLORIDE 1 MG/ML
.3-.5 INJECTION, SOLUTION INTRAMUSCULAR; INTRAVENOUS; SUBCUTANEOUS
Status: DISCONTINUED | OUTPATIENT
Start: 2019-03-26 | End: 2019-03-26

## 2019-03-26 RX ORDER — NALOXONE HYDROCHLORIDE 0.4 MG/ML
.1-.4 INJECTION, SOLUTION INTRAMUSCULAR; INTRAVENOUS; SUBCUTANEOUS
Status: DISCONTINUED | OUTPATIENT
Start: 2019-03-26 | End: 2019-03-26

## 2019-03-26 RX ORDER — MAGNESIUM HYDROXIDE 1200 MG/15ML
LIQUID ORAL PRN
Status: DISCONTINUED | OUTPATIENT
Start: 2019-03-26 | End: 2019-03-26 | Stop reason: HOSPADM

## 2019-03-26 RX ORDER — HEPARIN SODIUM 5000 [USP'U]/.5ML
5000 INJECTION, SOLUTION INTRAVENOUS; SUBCUTANEOUS
Status: COMPLETED | OUTPATIENT
Start: 2019-03-26 | End: 2019-03-26

## 2019-03-26 RX ORDER — ACETAMINOPHEN 325 MG/1
650 TABLET ORAL EVERY 4 HOURS PRN
Status: DISCONTINUED | OUTPATIENT
Start: 2019-03-29 | End: 2019-03-28 | Stop reason: HOSPADM

## 2019-03-26 RX ORDER — CEFAZOLIN SODIUM 2 G/100ML
2 INJECTION, SOLUTION INTRAVENOUS EVERY 8 HOURS
Status: COMPLETED | OUTPATIENT
Start: 2019-03-26 | End: 2019-03-27

## 2019-03-26 RX ORDER — HYDROMORPHONE HYDROCHLORIDE 1 MG/ML
.3-.5 INJECTION, SOLUTION INTRAMUSCULAR; INTRAVENOUS; SUBCUTANEOUS EVERY 5 MIN PRN
Status: DISCONTINUED | OUTPATIENT
Start: 2019-03-26 | End: 2019-03-26 | Stop reason: HOSPADM

## 2019-03-26 RX ORDER — DIPHENHYDRAMINE HCL 25 MG
25 CAPSULE ORAL EVERY 6 HOURS PRN
Status: DISCONTINUED | OUTPATIENT
Start: 2019-03-26 | End: 2019-03-28 | Stop reason: HOSPADM

## 2019-03-26 RX ORDER — SIMETHICONE 20 MG/.3ML
100 EMULSION ORAL 4 TIMES DAILY
Status: DISCONTINUED | OUTPATIENT
Start: 2019-03-26 | End: 2019-03-28 | Stop reason: HOSPADM

## 2019-03-26 RX ORDER — ONDANSETRON 4 MG/1
4 TABLET, ORALLY DISINTEGRATING ORAL EVERY 30 MIN PRN
Status: DISCONTINUED | OUTPATIENT
Start: 2019-03-26 | End: 2019-03-26 | Stop reason: HOSPADM

## 2019-03-26 RX ORDER — ONDANSETRON 4 MG/1
4 TABLET, ORALLY DISINTEGRATING ORAL EVERY 6 HOURS PRN
Status: DISCONTINUED | OUTPATIENT
Start: 2019-03-26 | End: 2019-03-28 | Stop reason: HOSPADM

## 2019-03-26 RX ORDER — LIDOCAINE 40 MG/G
CREAM TOPICAL
Status: DISCONTINUED | OUTPATIENT
Start: 2019-03-26 | End: 2019-03-28 | Stop reason: HOSPADM

## 2019-03-26 RX ADMIN — PROPOFOL 300 MG: 10 INJECTION, EMULSION INTRAVENOUS at 11:05

## 2019-03-26 RX ADMIN — HYDROMORPHONE HYDROCHLORIDE 0.5 MG: 1 INJECTION, SOLUTION INTRAMUSCULAR; INTRAVENOUS; SUBCUTANEOUS at 13:59

## 2019-03-26 RX ADMIN — FAMOTIDINE 20 MG: 10 INJECTION, SOLUTION INTRAVENOUS at 16:30

## 2019-03-26 RX ADMIN — SODIUM CHLORIDE, POTASSIUM CHLORIDE, SODIUM LACTATE AND CALCIUM CHLORIDE: 600; 310; 30; 20 INJECTION, SOLUTION INTRAVENOUS at 10:20

## 2019-03-26 RX ADMIN — FENTANYL CITRATE 150 MCG: 50 INJECTION, SOLUTION INTRAMUSCULAR; INTRAVENOUS at 11:05

## 2019-03-26 RX ADMIN — HEPARIN SODIUM 5000 UNITS: 10000 INJECTION, SOLUTION INTRAVENOUS; SUBCUTANEOUS at 11:22

## 2019-03-26 RX ADMIN — SODIUM CHLORIDE, POTASSIUM CHLORIDE, SODIUM LACTATE AND CALCIUM CHLORIDE: 600; 310; 30; 20 INJECTION, SOLUTION INTRAVENOUS at 16:31

## 2019-03-26 RX ADMIN — GLYCOPYRROLATE 0.8 MG: 0.2 INJECTION, SOLUTION INTRAMUSCULAR; INTRAVENOUS at 12:52

## 2019-03-26 RX ADMIN — SODIUM CHLORIDE, POTASSIUM CHLORIDE, SODIUM LACTATE AND CALCIUM CHLORIDE: 600; 310; 30; 20 INJECTION, SOLUTION INTRAVENOUS at 23:09

## 2019-03-26 RX ADMIN — Medication 0.5 MG: at 17:45

## 2019-03-26 RX ADMIN — VECURONIUM BROMIDE 2 MG: 1 INJECTION, POWDER, LYOPHILIZED, FOR SOLUTION INTRAVENOUS at 12:08

## 2019-03-26 RX ADMIN — MIDAZOLAM 2 MG: 1 INJECTION INTRAMUSCULAR; INTRAVENOUS at 10:59

## 2019-03-26 RX ADMIN — ONDANSETRON 4 MG: 2 INJECTION INTRAMUSCULAR; INTRAVENOUS at 12:45

## 2019-03-26 RX ADMIN — MORPHINE SULFATE 2 MG: 2 INJECTION, SOLUTION INTRAMUSCULAR; INTRAVENOUS at 19:25

## 2019-03-26 RX ADMIN — HYDROMORPHONE HYDROCHLORIDE 0.5 MG: 1 INJECTION, SOLUTION INTRAMUSCULAR; INTRAVENOUS; SUBCUTANEOUS at 11:51

## 2019-03-26 RX ADMIN — FENTANYL CITRATE 50 MCG: 50 INJECTION, SOLUTION INTRAMUSCULAR; INTRAVENOUS at 11:41

## 2019-03-26 RX ADMIN — MORPHINE SULFATE 2 MG: 2 INJECTION, SOLUTION INTRAMUSCULAR; INTRAVENOUS at 23:09

## 2019-03-26 RX ADMIN — PROCHLORPERAZINE EDISYLATE 5 MG: 5 INJECTION INTRAMUSCULAR; INTRAVENOUS at 13:58

## 2019-03-26 RX ADMIN — ROCURONIUM BROMIDE 50 MG: 10 INJECTION INTRAVENOUS at 11:06

## 2019-03-26 RX ADMIN — PROCHLORPERAZINE EDISYLATE 10 MG: 5 INJECTION INTRAMUSCULAR; INTRAVENOUS at 23:01

## 2019-03-26 RX ADMIN — DEXMEDETOMIDINE HYDROCHLORIDE 8 MCG: 100 INJECTION, SOLUTION INTRAVENOUS at 12:48

## 2019-03-26 RX ADMIN — VECURONIUM BROMIDE 3 MG: 1 INJECTION, POWDER, LYOPHILIZED, FOR SOLUTION INTRAVENOUS at 11:34

## 2019-03-26 RX ADMIN — LIDOCAINE HYDROCHLORIDE 0.5 ML: 10 INJECTION, SOLUTION EPIDURAL; INFILTRATION; INTRACAUDAL; PERINEURAL at 10:14

## 2019-03-26 RX ADMIN — LIDOCAINE HYDROCHLORIDE 100 MG: 20 INJECTION, SOLUTION INFILTRATION; PERINEURAL at 11:05

## 2019-03-26 RX ADMIN — ONDANSETRON 4 MG: 2 INJECTION INTRAMUSCULAR; INTRAVENOUS at 13:29

## 2019-03-26 RX ADMIN — Medication 3 G: at 11:15

## 2019-03-26 RX ADMIN — FENTANYL CITRATE 25 MCG: 50 INJECTION, SOLUTION INTRAMUSCULAR; INTRAVENOUS at 12:53

## 2019-03-26 RX ADMIN — SIMETHICONE 100 MG: 20 EMULSION ORAL at 20:25

## 2019-03-26 RX ADMIN — DEXMEDETOMIDINE HYDROCHLORIDE 0.5 MCG/KG/HR: 100 INJECTION, SOLUTION INTRAVENOUS at 11:11

## 2019-03-26 RX ADMIN — ONDANSETRON 4 MG: 2 INJECTION INTRAMUSCULAR; INTRAVENOUS at 20:22

## 2019-03-26 RX ADMIN — NEOSTIGMINE METHYLSULFATE 5 MG: 1 INJECTION, SOLUTION INTRAVENOUS at 12:52

## 2019-03-26 RX ADMIN — VECURONIUM BROMIDE 1 MG: 1 INJECTION, POWDER, LYOPHILIZED, FOR SOLUTION INTRAVENOUS at 12:36

## 2019-03-26 RX ADMIN — FENTANYL CITRATE 50 MCG: 50 INJECTION, SOLUTION INTRAMUSCULAR; INTRAVENOUS at 12:12

## 2019-03-26 RX ADMIN — Medication 0.5 MG: at 15:21

## 2019-03-26 RX ADMIN — DEXAMETHASONE SODIUM PHOSPHATE 4 MG: 4 INJECTION, SOLUTION INTRA-ARTICULAR; INTRALESIONAL; INTRAMUSCULAR; INTRAVENOUS; SOFT TISSUE at 11:19

## 2019-03-26 RX ADMIN — KETOROLAC TROMETHAMINE 30 MG: 30 INJECTION, SOLUTION INTRAMUSCULAR at 16:30

## 2019-03-26 RX ADMIN — DEXMEDETOMIDINE HYDROCHLORIDE 4 MCG: 100 INJECTION, SOLUTION INTRAVENOUS at 12:50

## 2019-03-26 RX ADMIN — SCOPALAMINE 1 PATCH: 1 PATCH, EXTENDED RELEASE TRANSDERMAL at 17:42

## 2019-03-26 RX ADMIN — SODIUM CHLORIDE, POTASSIUM CHLORIDE, SODIUM LACTATE AND CALCIUM CHLORIDE: 600; 310; 30; 20 INJECTION, SOLUTION INTRAVENOUS at 12:15

## 2019-03-26 RX ADMIN — CEFAZOLIN SODIUM 2 G: 2 INJECTION, SOLUTION INTRAVENOUS at 19:40

## 2019-03-26 ASSESSMENT — ACTIVITIES OF DAILY LIVING (ADL)
ADLS_ACUITY_SCORE: 12
ADLS_ACUITY_SCORE: 12

## 2019-03-26 ASSESSMENT — MIFFLIN-ST. JEOR: SCORE: 2515.49

## 2019-03-26 NOTE — PLAN OF CARE
Pt back from surgery, oriented to room and call lights, pt slightly nauseated, scop patch ordered, pain medications order changed to morphine because of nausea, lap sites clean dry and intact, abd binder in place, ice applied, taking ice chips sparingly, no saravia in place due to void,

## 2019-03-26 NOTE — PROGRESS NOTES
Admission medication history interview status for the 3/26/2019  admission is complete. See EPIC admission navigator for prior to admission medications     Medication history source reliability:Good    Medication history interview source(s):Patient    Medication history resources (including written lists, pill bottles, clinic record):None    Primary pharmacy.F/V    Additional medication history information not noted on PTA med list :None    Time spent in this activity: 30 minutes    Prior to Admission medications    Medication Sig Last Dose Taking? Auth Provider   cetirizine (ZYRTEC) 10 MG tablet Take 10 mg by mouth daily Past Week at Unknown time Yes Reported, Patient   Cholecalciferol (VITAMIN D3 GUMMIES ADULT PO) Take 2 chew tab by mouth daily Past Week at Unknown time Yes Reported, Patient   Multiple Vitamins-Iron (MULTIVITAMIN/IRON PO) Take 1 tablet by mouth daily Past Week at Unknown time Yes Reported, Patient

## 2019-03-26 NOTE — ANESTHESIA POSTPROCEDURE EVALUATION
Patient: Aguilar Renteria    Procedure(s):  COMBINED LAPAROSCOPIC GASTRIC SLEEVE    Diagnosis:OBESITY, MORBID  Diagnosis Additional Information: No value filed.    Anesthesia Type:  General, ETT    Note:  Anesthesia Post Evaluation    Patient location during evaluation: PACU  Patient participation: Able to fully participate in evaluation  Level of consciousness: awake  Pain management: adequate  Airway patency: patent  Cardiovascular status: acceptable  Respiratory status: acceptable  Hydration status: acceptable  PONV: controlled     Anesthetic complications: None          Last vitals:  Vitals:    03/26/19 0946 03/26/19 1320 03/26/19 1330   BP: 133/80 (!) 138/11 133/90   Pulse: 87 98 87   Resp: 16 19 19   Temp: 36.7  C (98  F) 36  C (96.8  F) 36  C (96.8  F)   SpO2: 98% 98% 97%         Electronically Signed By: JOHNNY MONACO MD  March 26, 2019  2:05 PM

## 2019-03-26 NOTE — ANESTHESIA PREPROCEDURE EVALUATION
Anesthesia Pre-Procedure Evaluation    Patient: Aguilar Renteria   MRN: [id number] : 1985          Preoperative Diagnosis: OBESITY, MORBID    Procedure(s):  COMBINED LAPAROSCOPIC GASTRIC SLEEVE, POSSIBLE CHOLECYSTECTOMY    Past Medical History:   Diagnosis Date     Anxiety May 2014    Took Citalapram for anxiety, no longer on medication.     Major depressive disorder with single episode, remission status unspecified      Past Surgical History:   Procedure Laterality Date     ENT SURGERY      TM repair     ORTHOPEDIC SURGERY  2017    Knee othroscopy       Anesthesia Evaluation     . Pt has had prior anesthetic.            ROS/MED HX    ENT/Pulmonary:     (+)sleep apnea, uses CPAP , . .    Neurologic:       Cardiovascular:         METS/Exercise Tolerance:     Hematologic:         Musculoskeletal:         GI/Hepatic:     (+) GERD       Renal/Genitourinary:         Endo:     (+) Obesity, .      Psychiatric:     (+) psychiatric history anxiety and depression      Infectious Disease:         Malignancy:         Other:                          Physical Exam  Normal systems: cardiovascular, pulmonary and dental    Airway   Mallampati: II  TM distance: >3 FB  Neck ROM: full    Dental     Cardiovascular       Pulmonary             Lab Results   Component Value Date    WBC 8.4 2018    HGB 15.9 2019    HCT 45.9 2018     2019     2018    POTASSIUM 4.0 2019    CHLORIDE 107 2018    CO2 31 2018    BUN 14 2018    CR 1.19 2019     (H) 2019    SILVERIO 9.1 2018    ALBUMIN 3.6 2018    PROTTOTAL 7.4 2018    ALT 44 2018    AST 25 2018    ALKPHOS 74 2018    BILITOTAL 0.9 2018    LIPASE 79 2016    TSH 0.81 2018       Preop Vitals  BP Readings from Last 3 Encounters:   19 126/78   19 130/82   18 128/83    Pulse Readings from Last 3 Encounters:   19 95   19 80  "  07/31/18 82      Resp Readings from Last 3 Encounters:   03/18/19 14   07/31/18 12   06/07/18 16    SpO2 Readings from Last 3 Encounters:   03/18/19 95%   02/07/19 98%   07/31/18 97%      Temp Readings from Last 1 Encounters:   03/18/19 36.7  C (98.1  F) (Oral)    Ht Readings from Last 1 Encounters:   03/18/19 1.905 m (6' 3\")      Wt Readings from Last 1 Encounters:   03/18/19 (!) 150.3 kg (331 lb 6.4 oz)    Estimated body mass index is 41.42 kg/m  as calculated from the following:    Height as of 3/18/19: 1.905 m (6' 3\").    Weight as of 3/18/19: 150.3 kg (331 lb 6.4 oz).       Anesthesia Plan      History & Physical Review  History and physical reviewed and following examination; no interval change.    ASA Status:  2 .    NPO Status:  > 8 hours    Plan for General and ETT with Intravenous induction. Maintenance will be Balanced.    PONV prophylaxis:  Ondansetron (or other 5HT-3)  Additional equipment: Videolaryngoscope      Postoperative Care  Postoperative pain management:  IV analgesics.      Consents  Anesthetic plan, risks, benefits and alternatives discussed with:  Patient..                 JOHNNY MONACO MD  "

## 2019-03-26 NOTE — BRIEF OP NOTE
[facility]    Brief Operative Note    Pre-operative diagnosis: OBESITY, MORBID  Post-operative diagnosis Morbid Obesity  Procedure: Procedure(s):  COMBINED LAPAROSCOPIC GASTRIC SLEEVE  Surgeon: Surgeon(s) and Role:     * Ren Guerrero MD - Primary     * Kareem Edgar PA-C - Assisting  Anesthesia: General   Estimated blood loss: 20 mL  Drains: None  Specimens:   ID Type Source Tests Collected by Time Destination   A : portion of stomache Tissue Other SURGICAL PATHOLOGY EXAM Ren Guerrero MD 3/26/2019 12:27 PM      Findings:   None.  Gallbladder WNL.  Complications: None.  Implants: None.  36F ViSiGi tube used for stomach evacuation and sleeve sizing  See Operative Report for full details.  No complications noted.

## 2019-03-26 NOTE — ANESTHESIA CARE TRANSFER NOTE
Patient: Aguilar Renteria    Procedure(s):  COMBINED LAPAROSCOPIC GASTRIC SLEEVE    Diagnosis: OBESITY, MORBID  Diagnosis Additional Information: No value filed.    Anesthesia Type:   General, ETT     Note:  Airway :Face Mask  Patient transferred to:PACU  Handoff Report: Identifed the Patient, Identified the Reponsible Provider, Reviewed the pertinent medical history, Discussed the surgical course, Reviewed Intra-OP anesthesia mangement and issues during anesthesia, Set expectations for post-procedure period and Allowed opportunity for questions and acknowledgement of understanding      Vitals: (Last set prior to Anesthesia Care Transfer)    CRNA VITALS  3/26/2019 1247 - 3/26/2019 1321      3/26/2019             EKG:  Sinus rhythm                Electronically Signed By: NABIL Stanton CRNA  March 26, 2019  1:21 PM

## 2019-03-27 LAB
ANION GAP SERPL CALCULATED.3IONS-SCNC: 6 MMOL/L (ref 3–14)
CHLORIDE SERPL-SCNC: 107 MMOL/L (ref 94–109)
CO2 SERPL-SCNC: 27 MMOL/L (ref 20–32)
COPATH REPORT: NORMAL
GLUCOSE SERPL-MCNC: 102 MG/DL (ref 70–99)
HGB BLD-MCNC: 14.4 G/DL (ref 13.3–17.7)
POTASSIUM SERPL-SCNC: 3.8 MMOL/L (ref 3.4–5.3)
SODIUM SERPL-SCNC: 140 MMOL/L (ref 133–144)

## 2019-03-27 PROCEDURE — 12000000 ZZH R&B MED SURG/OB

## 2019-03-27 PROCEDURE — 25000128 H RX IP 250 OP 636: Performed by: SURGERY

## 2019-03-27 PROCEDURE — 25800030 ZZH RX IP 258 OP 636: Performed by: PHYSICIAN ASSISTANT

## 2019-03-27 PROCEDURE — 80051 ELECTROLYTE PANEL: CPT | Performed by: PHYSICIAN ASSISTANT

## 2019-03-27 PROCEDURE — 85018 HEMOGLOBIN: CPT | Performed by: PHYSICIAN ASSISTANT

## 2019-03-27 PROCEDURE — 25000128 H RX IP 250 OP 636: Performed by: PHYSICIAN ASSISTANT

## 2019-03-27 PROCEDURE — 36415 COLL VENOUS BLD VENIPUNCTURE: CPT | Performed by: PHYSICIAN ASSISTANT

## 2019-03-27 PROCEDURE — 82947 ASSAY GLUCOSE BLOOD QUANT: CPT | Performed by: PHYSICIAN ASSISTANT

## 2019-03-27 PROCEDURE — 25000132 ZZH RX MED GY IP 250 OP 250 PS 637: Performed by: PHYSICIAN ASSISTANT

## 2019-03-27 PROCEDURE — 25000125 ZZHC RX 250: Performed by: PHYSICIAN ASSISTANT

## 2019-03-27 RX ADMIN — CEFAZOLIN SODIUM 2 G: 2 INJECTION, SOLUTION INTRAVENOUS at 04:45

## 2019-03-27 RX ADMIN — ENOXAPARIN SODIUM 40 MG: 40 INJECTION SUBCUTANEOUS at 21:26

## 2019-03-27 RX ADMIN — SODIUM CHLORIDE, POTASSIUM CHLORIDE, SODIUM LACTATE AND CALCIUM CHLORIDE: 600; 310; 30; 20 INJECTION, SOLUTION INTRAVENOUS at 15:33

## 2019-03-27 RX ADMIN — OXYCODONE HYDROCHLORIDE 10 MG: 5 TABLET ORAL at 21:58

## 2019-03-27 RX ADMIN — KETOROLAC TROMETHAMINE 30 MG: 30 INJECTION, SOLUTION INTRAMUSCULAR at 11:34

## 2019-03-27 RX ADMIN — FAMOTIDINE 20 MG: 10 INJECTION, SOLUTION INTRAVENOUS at 04:46

## 2019-03-27 RX ADMIN — ENOXAPARIN SODIUM 40 MG: 40 INJECTION SUBCUTANEOUS at 08:45

## 2019-03-27 RX ADMIN — CETIRIZINE HYDROCHLORIDE 10 MG: 10 TABLET, FILM COATED ORAL at 13:53

## 2019-03-27 RX ADMIN — OXYCODONE HYDROCHLORIDE 5 MG: 5 TABLET ORAL at 15:33

## 2019-03-27 RX ADMIN — SIMETHICONE 1.5 MG: 20 EMULSION ORAL at 13:46

## 2019-03-27 RX ADMIN — FAMOTIDINE 20 MG: 10 INJECTION, SOLUTION INTRAVENOUS at 15:33

## 2019-03-27 RX ADMIN — ACETAMINOPHEN 975 MG: 325 TABLET, FILM COATED ORAL at 21:26

## 2019-03-27 RX ADMIN — SIMETHICONE 100 MG: 20 EMULSION ORAL at 21:26

## 2019-03-27 RX ADMIN — SIMETHICONE 100 MG: 20 EMULSION ORAL at 08:45

## 2019-03-27 RX ADMIN — MORPHINE SULFATE 2 MG: 2 INJECTION, SOLUTION INTRAMUSCULAR; INTRAVENOUS at 04:45

## 2019-03-27 RX ADMIN — SIMETHICONE 100 MG: 20 EMULSION ORAL at 19:36

## 2019-03-27 RX ADMIN — ONDANSETRON 4 MG: 2 INJECTION INTRAMUSCULAR; INTRAVENOUS at 04:46

## 2019-03-27 RX ADMIN — MORPHINE SULFATE 2 MG: 2 INJECTION, SOLUTION INTRAMUSCULAR; INTRAVENOUS at 08:39

## 2019-03-27 RX ADMIN — ACETAMINOPHEN 975 MG: 325 TABLET, FILM COATED ORAL at 13:48

## 2019-03-27 RX ADMIN — OXYCODONE HYDROCHLORIDE 5 MG: 5 TABLET ORAL at 18:55

## 2019-03-27 RX ADMIN — PROCHLORPERAZINE EDISYLATE 10 MG: 5 INJECTION INTRAMUSCULAR; INTRAVENOUS at 08:39

## 2019-03-27 RX ADMIN — SODIUM CHLORIDE, POTASSIUM CHLORIDE, SODIUM LACTATE AND CALCIUM CHLORIDE: 600; 310; 30; 20 INJECTION, SOLUTION INTRAVENOUS at 21:59

## 2019-03-27 ASSESSMENT — ACTIVITIES OF DAILY LIVING (ADL)
ADLS_ACUITY_SCORE: 11

## 2019-03-27 NOTE — PROGRESS NOTES
"Bariatric Surgery Progress Note         Assessment:      Aguilar Renteria is a 33 year old male S/P Lap Sleeve Gastrectomy;, POD #1   Morbid Obesity, BMI >40            Plan:   Start water any time now.  If tolerates, Clears at lunch or dinner.  Fulls tomorrow am.  D/C IV morphine when tolerating PO and start Tylenol/oxycodone.  Antiemetics PRN N/V.  Continue simethicone.  Lovenox and PCDs.  Ambulate at least QID  Discussed OR findings    Dispo: Likely home 1-2 days        Interval History:   Doing a little better this am.  Moderate nausea yesterday.  Tried changing IV pain meds, adding scop patch on top of compazine and zofran.  Had at least 2-3 emesis, with some blood tinge.  Pain is pretty well tolerated and to Right side of abdomen where portion of stomach removed.         Physical Exam:   /79 (BP Location: Right arm)   Pulse 99   Temp 97.7  F (36.5  C) (Oral)   Resp 16   Ht 1.892 m (6' 2.5\")   Wt 148.8 kg (328 lb)   SpO2 98%   BMI 41.55 kg/m    I/O last 3 completed shifts:  In: 1500 [I.V.:1500]  Out: 420 [Urine:400; Blood:20]  General: NAD, pleasant, alert and oriented x3  Abdomen: soft, with binder on  Incision: no complaints     Labs (most recent at bottom):     Results for orders placed or performed during the hospital encounter of 03/26/19 (from the past 24 hour(s))   Surgical pathology exam   Result Value Ref Range    Copath Report       Patient Name: AGUILAR RENTERIA  MR#: [id number]  Specimen #: [id number]  Collected: 3/26/2019  Received: 3/26/2019  Reported: 3/27/2019 08:45  Ordering Phy(s): MARISELA TAMAYO    For improved result formatting, select 'View Enhanced Report Format' under   Linked Documents section.    SPECIMEN(S):  Portion of stomach    FINAL DIAGNOSIS:  Stomach, sleeve gastrectomy  - Grossly unremarkable portion of stomach (gross examination only)    Electronically signed out by:    Danielle Falcon M.D.    CLINICAL HISTORY:  33 year old, morbid obesity    GROSS:  The specimen is " "received in formalin, and is labeled \"portion of stomach\"   with the patient's name and proper  identification.  The specimen consists of 165 gram, pink-red portion of   stomach(20.3 x 4.4 x 3.1 cm) with a  stapled surgical resection margin (21.6 cm).  The staples are removed and   the gastric mucosa is pink with  unremarkable mucosal folds.  No perforations or mass lesions identified   grossly.  Gross only, supervisor  direct per Dr Brook Falcon. (Dictated by: Maurisio Bee 3/26/2019 02:42 PM)    The technical component of this testing was completed at the [facility], with the professional component performed   at the [facility]  [facility]atory, [address] [place] [address]  [phone] ([phone])    CPT Codes:  A: 39540-MH    COLLECTION SITE:  Client: [facility]  Location: [facility] (S)       EKG 12-lead, tracing only   Result Value Ref Range    Interpretation ECG Click View Image link to view waveform and result    Platelet count   Result Value Ref Range    Platelet Count 205 150 - 450 10e9/L   Creatinine   Result Value Ref Range    Creatinine 1.05 0.66 - 1.25 mg/dL    GFR Estimate >90 >60 mL/min/[1.73_m2]    GFR Estimate If Black >90 >60 mL/min/[1.73_m2]   Glucose by meter   Result Value Ref Range    Glucose 136 (H) 70 - 99 mg/dL   Hemoglobin   Result Value Ref Range    Hemoglobin 14.4 13.3 - 17.7 g/dL   Electrolyte panel   Result Value Ref Range    Sodium 140 133 - 144 mmol/L    Potassium 3.8 3.4 - 5.3 mmol/L    Chloride 107 94 - 109 mmol/L    Carbon Dioxide 27 20 - 32 mmol/L    Anion Gap 6 3 - 14 mmol/L   Glucose   Result Value Ref Range    Glucose 102 (H) 70 - 99 mg/dL         Kareem Edgar  Pager: [phone]  [facility]: [phone]         "

## 2019-03-27 NOTE — OP NOTE
Surgeon: Ren Guerrero MD.  1st Assistant: Kareem Edgar PA-C, The physicians assistant was medically necessary for their expertise in camera management, suctioning, suturing, and retraction.    PREOPERATIVE DIAGNOSES:   1. Morbid obesity.   Indication for operation:  Body mass index is 41.55 kg/m .  Aguilar Renteria has been previously unsuccessful with medical treatment for obesity and some of her comorbidities are directly related to obesity.    Past Medical History:   Diagnosis Date     Anxiety May 2014    Took Citalapram for anxiety, no longer on medication.     Major depressive disorder with single episode, remission status unspecified      Sleep apnea        POSTOPERATIVE DIAGNOSES:   1. Morbid obesity.   OPERATIVE PROCEDURE: Laparoscopic sleeve gastrectomy for morbid obesity.   ANESTHESIA: General.   ESTIMATED BLOOD LOSS: Less than 5 mL.   OPERATIVE PROCEDURE: After induction of general endotracheal anesthesia, Aguilar Renteria abdomen was prepped and draped in the usual sterile fashion. With a direct visualization trocar in the left upper quadrant, the abdomen was entered and pneumoperitoneum was established. Initial survey of the abdomen showed a normal appearance to the liver and omentum. A 15 mm trocar was placed in the right mid abdomen, a 10 mm trocar was placed in the left mid abdomen, and a 5 mm trocar was placed in the left flank. A Ayden retractor was placed through a subxiphoid incision and used to elevate the left lobe of the liver anterior and to the right.  We then took down the angle of His with electrocautery and blunt dissection. This was followed by evaluation of the pylorus and using cautery to emma the stomach 6 cm proximal to the pylorus. We then took down the short gastric vessels with LigaSure device all the way from our emma 6 cm proximal to the pylorus up to the GE junction. Once the stomach was mobilized, we ensured no posterior adhesions to the pancreas were inhibiting adequate  positioning of the stomach. At this point, a 36-Armenian orogastric tube was advanced into the patient's stomach and down into the duodenum and utilized to decompress any remaining stomach content and to gauge the size of our sleeve gastrectomy. We start firing Endo-KENYA staplers which had incorporated buttressing material from the 6 cm emma, proximal to the pylorus all the way up to the GE junction. At first we utilized black loads, later on purple loads. We ensured no twisting of the sleeve, good size of the sleeve and hemostatic and secure apposition of staples. Once the resection was completed, the amputated stomach was removed from the patient's abdomen and sent to pathology. We then occluded the stomach with the blunt grasper just prior to the pylorus and serially dilated and evacuated the sleeve with saline, air and methylene blue containing solution showing no evidence of hemorrhage, nor air leakage, nor fluid leakage. Orogastric tube was removed and we then further secured the staple line from top to bottom utilizing Evicel material while at the same time adhering the adjacent omentum to the staple line. Gallbladder evaluated and found to be normal.  The abdomen was surveyed 1 more time no evidence of injuries noted. The 15 and 12 mm trocars in the right abdomen and left abdomen respectively were removed under direct visualization without evidence of bleeding and closed with Kj-Edwin device using an 0 Vicryl suture. The remaining trocars were removed under direct visualization without evidence of bleeding. Pneumoperitoneum was released and skin was approximated in all port sites with 4-0 Monocryl. Steri-Strips and sterile dressing applied. No immediate complications. Sponge and needle count reported correct.

## 2019-03-27 NOTE — PLAN OF CARE
Vital signs stable on home CPAP except b/p slight elevated in 140's. Alert and oriented. Lung sounds clear. Bowel sounds hypoactive, flatus negative. Lap site steri strips clean dry and intact with abdominal binder in place. Patient had about 25 ml of blood tinged emesis, controlled with zofran/compazine. Pain controlled with IV morphine. Up assist of 1, walked to door and back. Patient not tolerating sips quite yet. CMS/neuros intact. Adequate urine output .

## 2019-03-27 NOTE — PLAN OF CARE
Vss ex. B/p. A/Ox4. Abdominal incision sites CDI, with abdominal binder. Right-sided abdominal pain controlled with Toradol and Tylenol. Ice pack applied on abdominal pain. Up with assist x1. Advanced to clear diet. BS hypoactive, Flatus - . Voiding adequate. LS clear in all fields. IS to 1500 in 10 reps. Nausea controlled with Zofran and compazine, relief from nausea. Slow small liquids advised.

## 2019-03-27 NOTE — CONSULTS
"NUTRITION EDUCATION    REASON FOR ASSESSMENT:  Bariatric Surgery Consult    CURRENT DIET:  Bariatric Clear liquid    NUTRITION HISTORY:  Patient worked with clinical dietitian in Weight Loss Clinic prior to surgery to assist with lifestyle modification.    ANTHROPOMETRICS:   Ht: 6'2.5\"  Wt: 148.8 kg  BMI: 41.55  IBW: 87 kg  %IBW: 171%    ASSESSED NUTRITION NEEDS:  Energy Needs: 3778-6930 kcal  (11 - 14 kcal/kg ABW)                      Protein Needs:  g (1 - 1.5 gm/kg IBW)  Fluid Needs: 2912-6231 mL (1mL/kcal/ABW)    Know patient will not meet assessed needs due to the restrictive nature of surgery.    NUTRITION DIAGNOSIS:   Food- and nutrition related knowledge deficit related to bariatric surgery as evidence by sleeve gastrectomy surgery on 3/26/19.    INTERVENTIONS:    Nutrition Prescription:    Recommended patient follow bariatric diet advancement for sleeve gastrectomy    Implementation:    Nutrition Education (Content):  a) Reviewed Bariatric diet guidelines with patient and his mother  b) Provided handouts:  Nutrition After Sleeve Gastrectomy and Vitamin and Mineral Supplements After Weight Loss Surgery    Nutrition Education (Application):  c) Discussed current eating habits and recommended alternative food choices  d) Patient verbalizes understanding of diet by listing appropriate foods for home    Anticipate good compliance    Diet Education - refer to Education Flowsheet    Goals:    Patient will follow bariatric diet advancement schedule    Patient will follow post-operative vitamin mineral schedule      Follow Up:    Patient to follow up in 2 weeks with RD in Weight Loss Clinic    Provided RD contact information for future questions    Foreign Kuhn RD, LD  [facility]  [phone]    "

## 2019-03-28 VITALS
BODY MASS INDEX: 39.17 KG/M2 | OXYGEN SATURATION: 96 % | SYSTOLIC BLOOD PRESSURE: 125 MMHG | HEIGHT: 75 IN | RESPIRATION RATE: 16 BRPM | DIASTOLIC BLOOD PRESSURE: 72 MMHG | TEMPERATURE: 98 F | HEART RATE: 71 BPM | WEIGHT: 315 LBS

## 2019-03-28 LAB — GLUCOSE BLDC GLUCOMTR-MCNC: 72 MG/DL (ref 70–99)

## 2019-03-28 PROCEDURE — 25000128 H RX IP 250 OP 636: Performed by: PHYSICIAN ASSISTANT

## 2019-03-28 PROCEDURE — 25800030 ZZH RX IP 258 OP 636: Performed by: PHYSICIAN ASSISTANT

## 2019-03-28 PROCEDURE — 25000132 ZZH RX MED GY IP 250 OP 250 PS 637: Performed by: PHYSICIAN ASSISTANT

## 2019-03-28 PROCEDURE — 25000125 ZZHC RX 250: Performed by: PHYSICIAN ASSISTANT

## 2019-03-28 PROCEDURE — 00000146 ZZHCL STATISTIC GLUCOSE BY METER IP

## 2019-03-28 RX ORDER — ONDANSETRON 4 MG/1
4 TABLET, ORALLY DISINTEGRATING ORAL EVERY 6 HOURS PRN
Qty: 10 TABLET | Refills: 0 | Status: SHIPPED | OUTPATIENT
Start: 2019-03-28 | End: 2019-04-10

## 2019-03-28 RX ORDER — OXYCODONE HYDROCHLORIDE 5 MG/1
5-10 TABLET ORAL
Qty: 20 TABLET | Refills: 0 | Status: SHIPPED | OUTPATIENT
Start: 2019-03-28 | End: 2019-04-10

## 2019-03-28 RX ADMIN — RANITIDINE 75 MG: 75 TABLET, COATED ORAL at 09:03

## 2019-03-28 RX ADMIN — SIMETHICONE 100 MG: 20 EMULSION ORAL at 09:03

## 2019-03-28 RX ADMIN — ENOXAPARIN SODIUM 40 MG: 40 INJECTION SUBCUTANEOUS at 09:09

## 2019-03-28 RX ADMIN — ACETAMINOPHEN 975 MG: 325 TABLET, FILM COATED ORAL at 05:40

## 2019-03-28 RX ADMIN — OXYCODONE HYDROCHLORIDE 5 MG: 5 TABLET ORAL at 05:40

## 2019-03-28 RX ADMIN — CETIRIZINE HYDROCHLORIDE 10 MG: 10 TABLET, FILM COATED ORAL at 09:03

## 2019-03-28 RX ADMIN — FAMOTIDINE 20 MG: 10 INJECTION, SOLUTION INTRAVENOUS at 04:50

## 2019-03-28 RX ADMIN — OXYCODONE HYDROCHLORIDE 5 MG: 5 TABLET ORAL at 09:03

## 2019-03-28 RX ADMIN — OXYCODONE HYDROCHLORIDE 5 MG: 5 TABLET ORAL at 12:16

## 2019-03-28 RX ADMIN — SODIUM CHLORIDE, POTASSIUM CHLORIDE, SODIUM LACTATE AND CALCIUM CHLORIDE: 600; 310; 30; 20 INJECTION, SOLUTION INTRAVENOUS at 05:41

## 2019-03-28 ASSESSMENT — ACTIVITIES OF DAILY LIVING (ADL)
ADLS_ACUITY_SCORE: 11

## 2019-03-28 NOTE — DISCHARGE SUMMARY
[facility] Discharge Summary    Aguilar Renteria MRN# [id number]   Age: 33 year old YOB: 1985     Date of Admission:  3/26/2019  Date of Discharge:  3/28/2019  Admitting Provider:  Ren Guerrero MD  Discharge Provider:  Kareem Edgar PA-C with Dr. Guerrero  Discharging Service: Bariatric Surgery     Primary Provider: Jefferson Yanes  Primary Care Physician Phone Number: [phone]          Admission Diagnoses:   Principle Diagnosis: OBESITY, MORBID  Morbid obesity with BMI of 40.0-44.9, adult (H)  Secondary Diagnoses:  LIZANDRO, prediabetes associated with obesity  Unassociated to obesity: Major depressive disorder, anxiety, chronic low back pain           Discharge Diagnosis:   OBESITY, MORBID          Procedures:   Procedure(s): Laparoscopic Gastric Sleeve            Discharge Medications:     Current Discharge Medication List      START taking these medications    Details   ondansetron (ZOFRAN-ODT) 4 MG ODT tab Take 1 tablet (4 mg) by mouth every 6 hours as needed for nausea or vomiting  Qty: 10 tablet, Refills: 0    Associated Diagnoses: PONV (postoperative nausea and vomiting); Bariatric surgery status      oxyCODONE (ROXICODONE) 5 MG tablet Take 1-2 tablets (5-10 mg) by mouth every 3 hours as needed for moderate to severe pain  Qty: 20 tablet, Refills: 0    Associated Diagnoses: Bariatric surgery status; Morbid obesity with BMI of 40.0-44.9, adult (H)      ranitidine (ZANTAC) 75 MG tablet Take 1 tablet (75 mg) by mouth 2 times daily  Qty: 60 tablet, Refills: 2    Associated Diagnoses: Bariatric surgery status         CONTINUE these medications which have NOT CHANGED    Details   cetirizine (ZYRTEC) 10 MG tablet Take 10 mg by mouth daily      Cholecalciferol (VITAMIN D3 GUMMIES ADULT PO) Take 2 chew tab by mouth daily      Multiple Vitamins-Iron (MULTIVITAMIN/IRON PO) Take 1 tablet by mouth daily                 Allergies:       No Known Allergies          Brief History of Illness:   Aguilar WILDA  "Dexter is a 33 year old-year-old male who underwent preoperative screening in anticipation for potential bariatric surgery. He was deemed an appropriate candidate for bariatric surgery by the consensus of our [place] [facility] team. In the office, surgical options were thoroughly discussed. He was furnished with a copy of our specialized consent form for bariatric surgery. The potential risks as outlined in that document were all thoroughly reviewed. All questions were answered and he wished to proceed.    On the day of surgery, after reviewing the risks, benefits, and possible complications, informed consent was obtained and the patient underwent the above procedure.  There were no complications.  Please see the Operative Report for full details.           Hospital Course:   Aguilar Renteria's hospital course was unremarkable.  He recovered as anticipated and experienced no post-operative complications. However, he did have some moderate nausea with few emesis POD0-1.  Felt better POD2.  Had some pain at Right incision and felt a pop here.      On the date of discharge, the patient was discharged to home in stable condition and afebrile.  He verbalized understanding of all discharge instructions and felt comfortable with the discharge plan.  He was asked to call with any further questions or concerns.         Image Results From This Hospital Stay:      No results found for this or any previous visit.         Condition on Discharge:      Discharge condition: Stable   Discharge vitals: Blood pressure 124/73, pulse 71, temperature 98.6  F (37  C), temperature source Oral, resp. rate 16, height 1.892 m (6' 2.5\"), weight 148.8 kg (328 lb), SpO2 97 %.           Discharge Disposition:   Discharged to home          Discharge Instructions and Follow-Up:      Aguilar Renteria was asked to follow up with the bariatric surgical team within 1 week.  He will see our clinic SHREYA at that visit, with plans to reconvene with a " Registered Dietician at the 2nd week office visit.    Kareem Edgar PA-C  dictating on behalf of Dr. Guerrero  [phone]

## 2019-03-28 NOTE — PLAN OF CARE
A/Ox4. AVSS on CPAP. Pain controlled with PRN oxy. Full liquid diet starting this morning. Up independent. 5 lap sites, CDI. ABD binder in place. LS- clear. BS-hypo, neg flatus. Voiding adequately.

## 2019-03-28 NOTE — DISCHARGE INSTRUCTIONS
For informational purposes only. Not to replace the advice of your health care provider. Copyright   2006 [facility]. All rights reserved. [employer] [id number] - REV 09/14  After Bariatric Surgery  [facility]  Note: Before you go home, ask your nurse to order your pain medicine from the pharmacy. Be sure you have your medicine with you when you leave.  How much fluid should I drink?    Drink at least 1 ounce of fluid every 15 minutes (1/2 cup per hour) during the day.     Carry a water bottle with you. Drink from it often.    Keep track of how much fluid you drink in a day.    Adjustable stomach band: Do not overeat or drink too much. This can cause vomiting (throwing up), stretch your stomach, or make your stomach slip up over your band.    Remember:  - Do not use straws, chew gum or suck on hard candies. They may cause painful gas.   - No cold drinks.  - No coffee, soda pop or drinks with caffeine. These may cause stomach pain.  - No alcohol. It is bad for your liver and will cause stomach pain. It also adds a lot of calories.  What can I do for pain control?      You had major abdominal surgery that involves all layers of your abdominal muscles.   Pain is expected, even as far out as 6-8 weeks postop.  Moving, sneezing, coughing, and  breathing will cause pain because these activities use your abdominal muscles.      You can take the liquid pain medicine as prescribed. You can also try to wean off from it  as soon as you feel comfortable.  Do not drive while you are taking pain medicine.   This is dangerous.     You may take liquid Tylenol (acetaminophen) for pain in place of the prescribed pain  medicine. Do not take more than 3000 mg in a 24 hour period.     You may also apply ice or heat to the affected area(s).  Just remember to wrap the ice  in something and limit icing sessions to 20 minutes. Excessive icing can irritate the skin  or cause tissue damage.   You can apply  heat with a hot, wet towel or heating pad. Just like cold therapy, limit  heat application to 20 minutes. Never sleep with a heating pad on. It could cause severe  burns to your skin.    Do not take NSAID s (ibuprofen, Motrin, Advil, Aleve, Naproxen). They will increase you risk for bleeding or getting an ulcer.    If you have any of the following pain issues, we would like to talk to you:  - pain that does not improve with rest  - pain that gets worse and worse  - pain that is not controlled by your pain medicine  - a sudden severe increase in pain  -   If your doctor prescribes Zantac, take it as ordered. Take it for 3 months to prevent       Ulcers.  -   If you took an antacid before you had surgery, keep taking it for 3 months after           surgery. This will help prevent ulcers.   - Take any other medicines that your doctor tells you to take.   - Wear your binder to support your belly muscles.  Please call the clinic at [phone] for any concerns      How much rest do I need?  Get plenty of rest the first few days after surgery. Balance rest and activity.  Do not nap more than one hour during the day. Set a timer to wake yourself up, if needed. Too much sleep will keep you from drinking enough fluid during the day.  What should I know about my incisions (cuts)?  - Change your bandages as needed or if they get wet.   -Call your doctor if you have any of these signs of infection:   - Redness around the site.   - Drainage that smells bad.   - Fever of 101  F (38.3  C) or higher when taken under the tongue.- Chills.  If you     have a drain:  - Do not pull on the drain. Do not pull the drain out. We will take out your drain at your first clinic visit.  - The color and amount of fluid varies. Right after surgery the fluid is bright red. Over time, it changes to light pink and may become clear or the color of straw.   Will my urine or bowel movements change?   You might not have a bowel movement for several days  after surgery. Your first bowel movements will likely be liquid.   Gastric bypass or sleeve gastrectomy: You may also notice old blood or a darker color in your stools (bowel movements).   Your urine should be clear to light yellow. This shows that you are drinking enough fluid. You should urinate (pass water) at least 2 to 3 times during the day. If not, call us.  What kind of activity is safe?  For 4 weeks after surgery:     Walk for a short time every day.    Do not jog or run.    No weight lifting or belly exercises.  No swimming, baths or hot tubs until your cuts are healed (scabs are gone). You may shower.  No outside activity in hot, humid weather until you can drink 48 to 64 ounces of fluid in 24 hours. If you sweat a lot, your body may lose too much water.   The first month after surgery, do not take any trips where you must sit for a long time. You could get a blood clot in your legs.  Call the clinic at [phone] if:    Your pain medicine is not working.    You do not urinate (pass water) 2 to 3 times per day.    You have any signs of infection.    You have belly pain that gets worse and worse.    You have swollen legs with pain behind the knee or calf.    You have chest pain or feel very short of breath.    You have any questions or concerns.    You have a sudden severe increase in heart rate.    You have vomiting that gets worse and worse.  When should I go back to the clinic?  Time Gastric bypass or sleeve gastrectomy Adjustable gastric band   Week 1 See the physician or physician assistant (PA). See the nurse and physical therapist.   Week 2 See the nurse and dietitian. See the nurse and dietitian.   Week 4 Have a nutrition consult with the dietitian. See the PA and dietitian.   Week 6  Go for the first adjustment (fill) of your stomach band.   For the first year (or until your BMI is less than 30) Go to the clinic each month to see if you need a band adjustment (fill).

## 2019-03-28 NOTE — PLAN OF CARE
Patient vitally stable. Minimal pain 3-4/10 controlled with oral oxycodone. Pt Alert and oriented x4. On room air with clear lung sounds. Abdominal binder in place. Lap sites are clean, dry, intact. Tolerating clear diet well. No nausea present. Voiding without difficulties. Not yet passing gas. Ambulating in hallways independently x2. Pt met with dietician today. Scop patch in place for intermittent nausea.

## 2019-03-28 NOTE — PLAN OF CARE
Pt pain controlled with oxycodone. Lab sites CDI. LS clear, VSS. Left peripheral IV discontinued and removed. - BS, + flatus. Pt discharged with IS to take home.Tolerated small full liquids well. Voided adequately. Up independently and ambulated hallways x2. Denied nausea. Discharge education and discharge medications reviewed with Pt and spouse. Pt leaves for home with spouse using personal transportation.

## 2019-03-29 ENCOUNTER — TELEPHONE (OUTPATIENT)
Dept: SURGERY | Facility: CLINIC | Age: 34
End: 2019-03-29

## 2019-03-29 LAB — INTERPRETATION ECG - MUSE: NORMAL

## 2019-03-29 NOTE — TELEPHONE ENCOUNTER
Called patient to check in on status postop.  Left message for patient to call back.  Gabby Brunner RN on 3/29/2019 at 1:38 PM

## 2019-04-02 ENCOUNTER — OFFICE VISIT (OUTPATIENT)
Dept: SURGERY | Facility: CLINIC | Age: 34
End: 2019-04-02
Payer: COMMERCIAL

## 2019-04-02 VITALS
DIASTOLIC BLOOD PRESSURE: 67 MMHG | HEART RATE: 100 BPM | OXYGEN SATURATION: 96 % | TEMPERATURE: 97.7 F | RESPIRATION RATE: 20 BRPM | WEIGHT: 313 LBS | BODY MASS INDEX: 39.65 KG/M2 | SYSTOLIC BLOOD PRESSURE: 122 MMHG

## 2019-04-02 DIAGNOSIS — Z98.84 BARIATRIC SURGERY STATUS: ICD-10-CM

## 2019-04-02 DIAGNOSIS — K91.2 POSTSURGICAL MALABSORPTION: ICD-10-CM

## 2019-04-02 DIAGNOSIS — E66.9 OBESITY: Primary | ICD-10-CM

## 2019-04-02 DIAGNOSIS — E66.812 CLASS 2 SEVERE OBESITY WITH SERIOUS COMORBIDITY AND BODY MASS INDEX (BMI) OF 39.0 TO 39.9 IN ADULT, UNSPECIFIED OBESITY TYPE (H): ICD-10-CM

## 2019-04-02 DIAGNOSIS — Z98.84 BARIATRIC SURGERY STATUS: Primary | ICD-10-CM

## 2019-04-02 DIAGNOSIS — E66.01 CLASS 2 SEVERE OBESITY WITH SERIOUS COMORBIDITY AND BODY MASS INDEX (BMI) OF 39.0 TO 39.9 IN ADULT, UNSPECIFIED OBESITY TYPE (H): ICD-10-CM

## 2019-04-02 PROCEDURE — 99207 ZZC NO CHARGE NURSE ONLY: CPT

## 2019-04-02 PROCEDURE — 99024 POSTOP FOLLOW-UP VISIT: CPT | Performed by: PHYSICIAN ASSISTANT

## 2019-04-02 RX ORDER — ACETAMINOPHEN 500 MG
500-1000 TABLET ORAL EVERY 6 HOURS PRN
COMMUNITY

## 2019-04-02 NOTE — PROGRESS NOTES
[facility]   1 Week Surgical Follow-Up     PCP:  Jefferson Yanes    DOS: 03/26/19  Surgeon: FAITH  Surgery Type: Sleeve  HISTORY OF PRESENT ILLNESS:  Aguilar Renteria returns today for his follow-up appointment status post bariatric surgery. Overall he is doing well.  He is currently using Tylenol for pain medication. Taking oxycodone at nighttime.  Refill Needed: No.  Patient's Pain Scale: 1The pain is located right side abdomen.   Patient's current daily fluid intake in oz is: 60+ oz water, crystal light.  Patient has started postoperative Vitamins: Yes.  Using CPAP  Activity:   Activity: walking 60 minutes/day including stairs  REVIEW OF SYSTEMS:  GI:    Nausea: Yes(with all vitamins at once)  Vomiting: No  Diarrhea: No  Constipation: Yes - Had a BM yesterday.  Does not feel constipated  Last BM: 4/1/19  Dysphagia: No  GERD: No - taking zantac    CV/Pulmonary:   Chest Pain: No(tightness with eating, burping)  Dizzy: Yes(position change)  Light Headed: Yes(position change) - minimal   SOB: No - using IS.  Gets up to 2500.  Endo:  Diabetes: No  :    Contraception: n/a  Dysuria: No  Vascular:    LE Edema: No       PHYSICAL EXAMINATION:    /67 (BP Location: Left arm, Patient Position: Sitting, Cuff Size: Adult Large)   Pulse 100   Temp 97.7  F (36.5  C) (Oral)   Resp 20   Wt 313 lb (142 kg)   SpO2 96%   BMI 39.65 kg/m      Reviewed patient previous pulses.  They were in the upper 90's low 100  GENERAL: No acute distress. Alert and oriented time 3.  HEART: Regular rate and rhythm.  LUNGS:  Clear to auscultation bilaterally.  ABDOMEN: Soft, incisions clean,dry, and intact. Tenderness WNL for post op.  EXTREMITIES: No lower extremity edema bilaterally. No calf swelling or tenderness. Negative roxie's  SKIN: No rashes.  PSYCHOLOGICAL: Stable. Pleasant    ASSESSMENT:    1. S/P bariatric surgery.  2. Post surgical malabsorption  3. LIZANDRO    PLAN:   Continue with recommended antacid medication for  the next 3 months. - RX given for zantac 150 mg bid.  LIZANDRO - check in with sleep center about appropriate mask  Strive to drink 64 oz of fluid daily.  Strive to move hourly while awake to decrease risk of developing a blood clot.  Continue to do deep breathing exercises twice daily or use your spirometer.  Follow up with your primary care provider to discuss obesity related conditions by one month post op.   Start recommended postoperative vitamins within in the first 6 weeks.  Advance diet per Dietitian at your 2 week appointment.   Make follow up appointment to meet with psychologist at 1 and 3 months post operatively.   Wear binder to support abdominal muscles and gradually wean off over the next few weeks.   Return to clinic for 2 wk post op appointment and bring post op vitamins along.  Call with questions or concerns at any time.

## 2019-04-02 NOTE — PATIENT INSTRUCTIONS
Continue with recommended antacid medication for the next 3 months. - RX given for zantac 150 mg bid.  LIZANDRO - check in with sleep center about appropriate mask  Strive to drink 64 oz of fluid daily.  Strive to move hourly while awake to decrease risk of developing a blood clot.  Continue to do deep breathing exercises twice daily or use your spirometer.  Follow up with your primary care provider to discuss obesity related conditions by one month post op.   Start recommended postoperative vitamins within in the first 6 weeks.  Advance diet per Dietitian at your 2 week appointment.   Make follow up appointment to meet with psychologist at 1 and 3 months post operatively.   Wear binder to support abdominal muscles and gradually wean off over the next few weeks.   Return to clinic for 2 wk post op appointment and bring post op vitamins along.  Call with questions or concerns at any time.

## 2019-04-02 NOTE — PROGRESS NOTES
Patient seen by provider in clinic who completed assessment.      Gabby Brunner RN on 4/2/2019 at 3:05 PM

## 2019-04-10 ENCOUNTER — OFFICE VISIT (OUTPATIENT)
Dept: SURGERY | Facility: CLINIC | Age: 34
End: 2019-04-10
Payer: COMMERCIAL

## 2019-04-10 VITALS
SYSTOLIC BLOOD PRESSURE: 115 MMHG | DIASTOLIC BLOOD PRESSURE: 72 MMHG | BODY MASS INDEX: 38.83 KG/M2 | OXYGEN SATURATION: 98 % | WEIGHT: 306.5 LBS | RESPIRATION RATE: 14 BRPM | TEMPERATURE: 98.2 F | HEART RATE: 77 BPM

## 2019-04-10 VITALS — BODY MASS INDEX: 38.11 KG/M2 | WEIGHT: 306.5 LBS | HEIGHT: 75 IN

## 2019-04-10 DIAGNOSIS — Z98.84 BARIATRIC SURGERY STATUS: ICD-10-CM

## 2019-04-10 DIAGNOSIS — E66.9 OBESITY (BMI 30-39.9): Primary | ICD-10-CM

## 2019-04-10 PROCEDURE — 99207 ZZC NO CHARGE NURSE ONLY: CPT

## 2019-04-10 PROCEDURE — 97803 MED NUTRITION INDIV SUBSEQ: CPT | Performed by: DIETITIAN, REGISTERED

## 2019-04-10 ASSESSMENT — MIFFLIN-ST. JEOR: SCORE: 2417.96

## 2019-04-10 NOTE — PROGRESS NOTES
"[facility]  2 Week Surgical Follow-Up     Current PCP:  Jefferson Yanes  Surgeon: FAITH  HISTORY OF PRESENT ILLNESS:  Aguilar Renteria returns today for his follow-up appointment status post Surgery Type: Sleeve DOS: 03/26/19.  He is accompanied by Self. His daily fluid intake in oz is: 64 ounces of water, pro drink and Crystal Light.    Feels well emotionally Yes.   Patient reports using: Alcohol No     Cigarettes - No  Pain:    He is currently using Pain Meds: no oxycodone since last Tues. and no regular Tyl for pain relief. He rates his pain at a Pain Scale: 0 on the pain scale.  Refill Needed: No  Activity:  The patient is considered a fall risk: No.   What are you doing for physical activity? Activity: walking 30\" most days  Patient plans to use gym.    Review of Systems:   GI:  Nausea: No   Vomiting: No   GERD: No  Diarrhea: No          Constipation: Yes tends more toward hard stools therefore using prune juice  Patient's Last BM: 4/10/19 harder and darker side   Discussed using prune juice warm on an empty stomach or Miralax 2-3 times per week until able to get more fiber in.        Neuro/CV/Pulmonary:   Dizzy: No   Light Headed: No   SOB: No   Chest Pain: No   CPAP: Yes  Vascular:    LE Edema: No  Mak's Negative  :  Form of birth control is Contraception: na  Symptoms of UTI:  Dysuria: No  Endo: Diabetes: No  BS check (freq): na                  Avg. BS Level: na  PHYSICAL EXAMINATION:    VITALS:  /72 (BP Location: Left arm, Patient Position: Sitting, Cuff Size: Adult Large)   Pulse 77   Temp 98.2  F (36.8  C)   Resp 14   Wt 306 lb 8 oz (139 kg)   SpO2 98%   BMI 38.83 kg/m                      LUNGS:  clear to auscultation  ABDOMEN: Abdomen soft, non-tender. BS normal. No masses.  INCISION: dry and intact    MEDICATIONS/VITAMINS/ALLERGIES REVIEWED: Yes  ASA Hx: No    ASSESSMENT:    1.  DOS: 03/26/19 status post gastric sleeve surgery.    Steri strip removed?  Yes   "     PLAN:    Make follow up appointment to meet with psychologist within 3 months post operatively. Has appointment 4/22/19.    Follow up with your primary care provider to obesity related conditions by one month post op. Per patient plan is to see at 3 months PO.     Advance diet per Dietitian at your 2 week appointment.   Discussed trying MVI at HS if current one upsetting stomach or changing to [place]'s Complete or Bariatric Advantage Solo - sample given # [id number].    Call with questions or concerns at any time.    Return to clinic in 4 weeks for nutrition visit.    Return to clinic postop month 3.    Guidelines provided re: how to increase activity/exercise?  Yes

## 2019-04-10 NOTE — PROGRESS NOTES
"BARIATRIC PROGRESS NOTE - 2 Week Post Op  DATE OF VISIT: April 10, 2019    Aguilar Renteria  1985  male  [id number]  33 year old    ASSESSMENT:    REASON FOR VISIT:  Aguilar Renteria is a 33 year old year old male presents today for a 2 Week Post Op nutrition follow-up appointment. Patient is accompanied by self      DIAGNOSIS:  Status: post gastric sleeve surgery.   Obesity Grade II BMI 35-39.9    ANTHROPOMETRICS:  Height: 189.2 cm (6' 2.5\")  Initial weight: 345 lb    Current Weight: 139 kg (306 lb 8 oz)  BMI: 38.83 kg/(m^2).    VITAMINS AND MINERALS:   Purchased Celebrate Multivitamin with Minerals-feels nauseated (with or without food)   500 mg Celebrate Calcium Citrate With Vitamin D TID  5000 International units Vitamin D (bed time)  33430 mcg Vitamin B-12 sublingual daily      NUTRITION HISTORY:  Tolerating diet: Bariatric full liquid diet  Fluids/water intake: 64+ ounces/day  Small bites: Yes  Portion size: 1/2 cup or less  20-30 minute meals: Yes  Breakfast: 2 oz Malt-O-Meal or cram of Wheat, 2 oz yogurt  Lunch: 2oz yogurt, 2 oz sugar free pudding  Dinner: 2 oz tomato soup, 2 oz yogurt  Snacks: protein drink (not sure of brand)  Nausea: a few X with multivitamins/ mineral  Vomiting: none  Constipation: yes, started 4 oz prune juice 2 days ago and had a bowel movement today  Additional Information: Patient says wife has been very supportive of him (making menus on spread sheet). Brought in vitamin bottle for RD to review. Offered suggestions for a different multivitamin/ mineral.     PHYSICAL ACTIVITY:  Type: walking  Frequency: 6-7 days a week.  Duration: 30 minutes.    DIAGNOSIS:     Current Nutrition Diagnosis: Altered gastrointestinal function related to alternation in gastrointestinal structure as evidenced by history of gastric sleeve.     INTERVENTION  Nutrition Prescription: Recommended bariatric puree diet.    Goals:  Start puree diet at day 14 post op.  Start a different multivitamin/ " mineral  Continue  calcium with vitamin D, vitamin B12, vitamin D  Aim for 90 grams protein.  Continue o 64 oz of fluid per day.    Implementation:  Discussed transition to puree diet.  Emphasized importance of adequate protein.  Reviewed required vitamins and mineral supplements.  Verbalizes good understanding of surgery diet guidelines.  Assessed learning needs and learning preferences.      NUTRITION MONITORING AND EVALUATION:   Monitor diet tolerance and weight loss.      Anticipated Compliance: good  Follow Up: Continue to monitor patient closely regarding weight loss and diet.  At 4 weeks Post Op    TIME SPENT WITH PATIENT: 25 minutes.    Foreign Kuhn RD, LD  [facility]  [phone]

## 2019-04-22 ENCOUNTER — PSYCHE (OUTPATIENT)
Dept: PSYCHOLOGY | Facility: CLINIC | Age: 34
End: 2019-04-22
Payer: COMMERCIAL

## 2019-04-22 DIAGNOSIS — F32.5 MAJOR DEPRESSIVE DISORDER, SINGLE EPISODE IN FULL REMISSION (H): Primary | ICD-10-CM

## 2019-04-22 PROCEDURE — 90834 PSYTX W PT 45 MINUTES: CPT | Performed by: PSYCHOLOGIST

## 2019-04-22 NOTE — PROGRESS NOTES
Progress Note    Client Name: Aguilar Renteria  Date: 4/22/2019         Service Type: Individual  Video Visit: No     Session Start Time: 9:00  Session End Time: 9:45     Session Length: 45    Session #: 4    Attendees: Client attended alone         DATA  Interactive Complexity: No  Crisis: No       Progress Since Last Session (Related to Symptoms / Goals / Homework):   Symptoms: stable       Episode of Care Goals: review adjustment to post-surgical lifestyle     Current / Ongoing Stressors and Concerns:   Reported that he returns to work tomorrow, which is a source of great stress. Reported that his recovery from surgery is going well, and he has been sticking to his recommendations and feels well supported by his family and weight loss team. Reported that there has been a lot of turmoil in his department and he has been experiencing a lot of interpersonal conflict with his boss, which leaves him feeling uncertain and vulnerable. Also reported being somewhat worried about adjusting his nutrition routine to fit into his work day, since he has been doing a good job so far and wants to maintain that progress. Reported feeling excited and nervous that his wife may be offered a job overseas. Reported that the relocation would be a welcome change, but he worries about getting adequate post-surgical care.      Treatment Objective(s) Addressed in This Session:   assess adjustment to post-surgical lifestyle  Stress management     Intervention:   Reviewed adjustment to post-surgical lifestyle. Reflected on the progress client has made and applauded his commitment to following post-surgical guidelines. Coached him on ways to maintain access to healthy foods at work if he forgets to bring his meals. Coached him on assertiveness skills to use in the workplace when encountering interpersonal strain. Explored options to get additional support in the workplace. Encouraged him to discuss  "options for care with his current providers if he is to relocate this summer. Provided active listening, support, and encouragement. Reinforced positive behavioral choices.         ASSESSMENT: Current Emotional / Mental Status (status of significant symptoms):   Risk status (Self / Other harm or suicidal ideation)   Client denies current fears or concerns for personal safety.   Client denies current or recent suicidal ideation or behaviors.   Client denies current or recent homicidal ideation or behaviors.   Client denies current or recent self injurious behavior or ideation.   Client denies other safety concerns.   Client Client reports there has been no change in risk factors since their last session.     Client Client reports there has been no change in protective factors since their last session.     A safety and risk management plan has not been developed at this time, however client was given the after-hours number / 911 should there be a change in any of these risk factors.     Appearance:   Appropriate    Eye Contact:   Good    Psychomotor Behavior: Normal    Attitude:   Cooperative    Orientation:   All   Speech    Rate / Production: Normal     Volume:  Normal    Mood:    Normal   Affect:    Appropriate    Thought Content:  Worry about returning to work    Thought Form:  Coherent  Logical    Insight:    Good      Medication Review:   No current psychiatric medications prescribed     Medication Compliance:   NA     Changes in Health Issues:   Yes: post bariatric surgery; reports his recovery is going well     Chemical Use Review:   Substance Use: Chemical use reviewed, no active concerns identified      Tobacco Use: No current tobacco use.      Diagnosis:  1. Major depressive disorder, single episode in full remission (H)        Collateral Reports Completed:   Not Applicable    PLAN: (Client Tasks / Therapist Tasks / Other)  Continue to follow nutrition guidelines. Consider keeping a \"stash\" of healthy foods " at your office to ensure access to nutritious options on busy days or if you forget to bring your meals. Continue with daily walks and physical activity. Client will return in June for 3 month post-surgical follow up, however, I remain a resource if there are needs that arise before the next visit.        Paola Shipman PsyD LP

## 2019-04-26 ENCOUNTER — OFFICE VISIT (OUTPATIENT)
Dept: SURGERY | Facility: CLINIC | Age: 34
End: 2019-04-26
Payer: COMMERCIAL

## 2019-04-26 VITALS — HEIGHT: 75 IN | BODY MASS INDEX: 37.44 KG/M2 | WEIGHT: 301.1 LBS

## 2019-04-26 DIAGNOSIS — E66.01 MORBID OBESITY (H): ICD-10-CM

## 2019-04-26 DIAGNOSIS — E66.01 MORBID OBESITY WITH BMI OF 40.0-44.9, ADULT (H): ICD-10-CM

## 2019-04-26 PROCEDURE — 97803 MED NUTRITION INDIV SUBSEQ: CPT | Performed by: DIETITIAN, REGISTERED

## 2019-04-26 ASSESSMENT — MIFFLIN-ST. JEOR: SCORE: 2393.47

## 2019-04-26 NOTE — PROGRESS NOTES
"BARIATRIC PROGRESS NOTE - 4 Week Post Op  DATE OF VISIT: April 26, 2019    Aguilar Renteria  1985  male  [id number]  33 year old    ASSESSMENT:    REASON FOR VISIT:  Aguilar Renteria is a 33 year old year old male presents today for a 4 Week Post Op nutrition follow-up appointment. Patient is accompanied by self      DIAGNOSIS:  Status: post gastric sleeve surgery.   Obesity Grade II BMI 35-39.9    ANTHROPOMETRICS:  Height: 6' 2.5\"   Initial weight: 345 lbs  Current Weight: 301 lbs 1.6 oz   BMI:  Body mass index is 38.14 kg/m .      VITAMINS AND MINERALS:   Multivitamin - Bariatric slow release (has ordered)  500 mg Celebrate Calcium Citrate With Vitamin D TID  5000 International units Vitamin D (bed time)  30014 mcg Vitamin B-12 sublingual daily      NUTRITION HISTORY:  Tolerating diet: Bariatric pureed diet  Fluids/water intake: 60-70oz ounces/day  (water, protein shakes)  Small bites: Yes  Portion size: 1/2-1 cup  20-30 minute meals: Yes  Fluids and meals  by 30 minutes: Yes  Chew foods thoroughly: Yes  Breakfast: eggs with ricotta cheese and leftover ham   Lunch: cottage cheese + blueberries   Dinner: pureed beef stew with mashed potatoes  egg bake  shredded chicken + polenta bake   Snacks: Protein Shake (premier or muscle milk light)   Nausea: none  Vomiting: none  Constipation: resolving with prune juice  Additional Information: Pt feeling well and tolerating diet. Mentions that he may be moving to [facility] in the fall and would like to discuss further transfer of care at next visit.       PHYSICAL ACTIVITY:  Type: walking/walking dog   Frequency: 7 days a week.  Duration: 15-30 minutes.    DIAGNOSIS:   Previous Nutrition Diagnosis: Altered gastrointestinal function related to alternation in gastrointestinal structure as evidenced by history of gastric sleeve.   No change    Previous Goals:  Start puree diet at day 14 post op.  - met  Start a different multivitamin/ mineral - met  Continue  calcium " with vitamin D, vitamin B12, vitamin D - met  Aim for 90 grams protein. - met  Continue o 64 oz of fluid per day.- met      Current Nutrition Diagnosis: Altered gastrointestinal function related to alternation in gastrointestinal structure as evidenced by history of gastric sleeve.     INTERVENTION  Nutrition Prescription: Recommended bariatric soft diet.    Goals:  Start soft diet at day 28 post op.  Continue MVI, calcium with vitamin D, vitamin B12, vitamin D  Start Vitamin B Complex  Aim for 60 to 90 grams protein.  Continue 48 to 64 oz of fluid per day.    Implementation:  Discussed transition to soft diet.  Emphasized importance of adequate protein.  Reviewed required vitamins and mineral supplements.  Verbalizes good understanding of surgery diet guidelines.  Assessed learning needs and learning preferences.      NUTRITION MONITORING AND EVALUATION:   Monitor diet tolerance and weight loss.      Anticipated Compliance: good  Follow Up: Continue to monitor patient closely regarding weight loss and diet.  At 3 months Post Op    TIME SPENT WITH PATIENT: 30 minutes.    Raina Arroyo RD, LD  Clinical Dietitian   [facility]      Physical Exam

## 2019-06-14 ENCOUNTER — PSYCHE (OUTPATIENT)
Dept: PSYCHOLOGY | Facility: CLINIC | Age: 34
End: 2019-06-14
Payer: COMMERCIAL

## 2019-06-14 DIAGNOSIS — F32.5 MAJOR DEPRESSIVE DISORDER, SINGLE EPISODE IN FULL REMISSION (H): Primary | ICD-10-CM

## 2019-06-14 PROCEDURE — 90834 PSYTX W PT 45 MINUTES: CPT | Performed by: PSYCHOLOGIST

## 2019-06-19 ENCOUNTER — OFFICE VISIT (OUTPATIENT)
Dept: INTERNAL MEDICINE | Facility: CLINIC | Age: 34
End: 2019-06-19
Payer: COMMERCIAL

## 2019-06-19 VITALS
HEIGHT: 75 IN | DIASTOLIC BLOOD PRESSURE: 66 MMHG | WEIGHT: 286.6 LBS | OXYGEN SATURATION: 96 % | SYSTOLIC BLOOD PRESSURE: 104 MMHG | RESPIRATION RATE: 16 BRPM | TEMPERATURE: 98.1 F | BODY MASS INDEX: 35.63 KG/M2 | HEART RATE: 79 BPM

## 2019-06-19 DIAGNOSIS — E66.01 MORBID OBESITY (H): ICD-10-CM

## 2019-06-19 DIAGNOSIS — F32.9 MAJOR DEPRESSIVE DISORDER WITH SINGLE EPISODE, REMISSION STATUS UNSPECIFIED: ICD-10-CM

## 2019-06-19 DIAGNOSIS — R21 RASH: Primary | ICD-10-CM

## 2019-06-19 PROCEDURE — 99214 OFFICE O/P EST MOD 30 MIN: CPT | Performed by: INTERNAL MEDICINE

## 2019-06-19 RX ORDER — TRIAMCINOLONE ACETONIDE 1 MG/G
CREAM TOPICAL 2 TIMES DAILY
Qty: 30 G | Refills: 1 | Status: SHIPPED | OUTPATIENT
Start: 2019-06-19

## 2019-06-19 ASSESSMENT — MIFFLIN-ST. JEOR: SCORE: 2327.7

## 2019-06-19 ASSESSMENT — PATIENT HEALTH QUESTIONNAIRE - PHQ9: SUM OF ALL RESPONSES TO PHQ QUESTIONS 1-9: 1

## 2019-06-19 NOTE — PROGRESS NOTES
Subjective     Aguilar Renteria is a 33 year old male who presents to clinic today for the following health issues:    HPI     3 month follow up post bariatric surgery. Doing well.  Weight loss noted.     Rash      Duration: Months     Description  Location: Left top of ankle   Itching: moderate    Intensity:  moderate    Accompanying signs and symptoms: redness, scaling, dryness     History (similar episodes/previous evaluation): None    Precipitating or alleviating factors:  New exposures:  None. Previous hx of athletes foot   Recent travel: no      Therapies tried and outcome: Lotrimin cream- no change     Other concerns:  1. Discuss how long to continue zantac - discussed.    Patient Active Problem List   Diagnosis     Anxiety state     Chronic bilateral low back pain without sciatica     CARDIOVASCULAR SCREENING; LDL GOAL LESS THAN 160     Major depressive disorder with single episode, remission status unspecified     Morbid obesity (H)     LIZANDRO on CPAP     Prediabetes     Morbid obesity with BMI of 40.0-44.9, adult (H)     Past Surgical History:   Procedure Laterality Date     COMBINED LAPAROSCOPIC GASTRIC SLEEVE, CHOLECYSTECTOMY N/A 3/26/2019    Procedure: COMBINED LAPAROSCOPIC GASTRIC SLEEVE;  Surgeon: Ren Guerrero MD;  Location: [facility] OR     ENT SURGERY      Tympanic membrane repair     ORTHOPEDIC SURGERY  09/01/2017    Knee othroscopy       Social History     Tobacco Use     Smoking status: Never Smoker     Smokeless tobacco: Never Used   Substance Use Topics     Alcohol use: Yes     Comment: occas     Family History   Problem Relation Age of Onset     Depression Mother      Thyroid Disease Mother      Obesity Mother      Anxiety Disorder Father      Heart Disease Maternal Grandfather      Breast Cancer Paternal Grandmother      Breast Cancer Other         Father's Sister         Current Outpatient Medications   Medication Sig Dispense Refill     acetaminophen (TYLENOL) 500 MG tablet Take 500-1,000 mg by mouth  "every 6 hours as needed for mild pain       Calcium Citrate-Vitamin D (CALCIUM CITRATE +D PO) Take 500 mg by mouth 3 times daily       cetirizine (ZYRTEC) 10 MG tablet Take 10 mg by mouth daily       cholecalciferol (VITAMIN D3) 5000 units (125 mcg) capsule Take by mouth daily       cyanocobalamin 1000 MCG SUBL Place 1 tablet under the tongue daily       Multiple Vitamins-Iron (MULTIVITAMIN/IRON PO) Take 2 chew tab by mouth daily        ranitidine (ZANTAC) 150 MG tablet Take 1 tablet (150 mg) by mouth 2 times daily 60 tablet 2     Allergies   Allergen Reactions     Asa [Aspirin] Other (See Comments)     Had gastric bypass 3/26/19.       Nsaids Other (See Comments)     Had gastric bypass 3/26/19.     BP Readings from Last 3 Encounters:   06/19/19 104/66   04/10/19 115/72   04/02/19 122/67    Wt Readings from Last 3 Encounters:   06/19/19 130 kg (286 lb 9.6 oz)   04/26/19 136.6 kg (301 lb 1.6 oz)   04/10/19 139 kg (306 lb 8 oz)            Reviewed and updated as needed this visit by Provider       Review of Systems   ROS COMP: CONSTITUTIONAL: NEGATIVE for fever, chills, change in weight  ENT/MOUTH: NEGATIVE for ear, mouth and throat problems  RESP: NEGATIVE for significant cough or SOB  CV: NEGATIVE for chest pain, palpitations or peripheral edema  GI: NEGATIVE for nausea, abdominal pain, heartburn, or change in bowel habits  : NEGATIVE for frequency, dysuria, or hematuria  MUSCULOSKELETAL: NEGATIVE for significant arthralgias or myalgia  NEURO: NEGATIVE for weakness, dizziness or paresthesias  ENDOCRINE: NEGATIVE for temperature intolerance, skin/hair changes  HEME: NEGATIVE for bleeding problems  PSYCHIATRIC: NEGATIVE for changes in mood or affect      Objective    /66   Pulse 79   Temp 98.1  F (36.7  C) (Oral)   Resp 16   Ht 1.892 m (6' 2.5\")   Wt 130 kg (286 lb 9.6 oz)   SpO2 96%   BMI 36.30 kg/m    Body mass index is 36.3 kg/m .  Physical Exam   GENERAL: alert and no distress  EYES: Eyes grossly " "normal to inspection, PERRL and conjunctivae and sclerae normal  HENT: ear canals and TM's normal, nose and mouth without ulcers or lesions  NECK: no adenopathy, no asymmetry, masses, or scars and thyroid normal to palpation  RESP: lungs clear to auscultation - no rales, rhonchi or wheezes  CV: regular rate and rhythm, normal S1 S2, no S3 or S4, no murmur, click or rub, no peripheral edema and peripheral pulses strong  ABDOMEN: obese  MS: no gross musculoskeletal defects noted, no edema  NEURO: Normal strength and tone, mentation intact and speech normal  PSYCH: mentation appears normal, affect normal/bright        Assessment & Plan     1. Rash  Suggested continuing with topical Lotrimin with addition of triamcinolone in the evening applications twice daily x2 weeks and if no improvement follow-up in dermatology clinic  - triamcinolone (KENALOG) 0.1 % external cream; Apply topically 2 times daily  Dispense: 30 g; Refill: 1    2. Morbid obesity (H)  Significant improvement in weight loss post bariatric surgery.  Follow-up with.  Nutrition as noted and continue with supplemental calcium, vitamin B12 and holding H2 blocker at present time as has had 3-month course    3. Major depressive disorder with single episode, remission status unspecified  Stable per PHQ 9 as noted       BMI:   Estimated body mass index is 36.3 kg/m  as calculated from the following:    Height as of this encounter: 1.892 m (6' 2.5\").    Weight as of this encounter: 130 kg (286 lb 9.6 oz).   Weight management plan: Discussed healthy diet and exercise guidelines      Work on weight loss  Regular exercise    Return in about 2 weeks (around 7/3/2019) for if symptoms recur or worsen.    Jefferson Yanes MD  [facility]      "

## 2019-06-19 NOTE — PROGRESS NOTES
Progress Note    Client Name: Aguilar Renteria  Date: 6/14/2019         Service Type: Individual  Video Visit: No     Session Start Time: 9:00  Session End Time: 9:45     Session Length: 45    Session #: 5    Attendees: Client attended alone         DATA  Interactive Complexity: No  Crisis: No       Progress Since Last Session (Related to Symptoms / Goals / Homework):   Symptoms: stable       Episode of Care Goals: review adjustment to post-surgical lifestyle     Current / Ongoing Stressors and Concerns:   Reported that he has resigned his job and is [place] with his [place]. Reported that he is feeling confident in his adjustment to post-surgical lifestyle changes and believes that a relocation will be a positive change for both of them. Reported feeling comfortable with his access to resources and medical care so that he can continue with his team to monitor progress over time. Also reported that he believes he will be more active in their new location because they will not have a car and will be relying on public transportation or getting places on foot.       Treatment Objective(s) Addressed in This Session:   assess adjustment to post-surgical lifestyle  Stress management     Intervention:   Reviewed adjustment to post-surgical lifestyle. Reflected on the progress client has made and applauded his commitment to following post-surgical guidelines. Provided active listening, support, and encouragement. Reinforced positive behavioral choices.         ASSESSMENT: Current Emotional / Mental Status (status of significant symptoms):   Risk status (Self / Other harm or suicidal ideation)   Client denies current fears or concerns for personal safety.   Client denies current or recent suicidal ideation or behaviors.   Client denies current or recent homicidal ideation or behaviors.   Client denies current or recent self injurious behavior or ideation.   Client denies other  safety concerns.   Client Client reports there has been no change in risk factors since their last session.     Client Client reports there has been no change in protective factors since their last session.     A safety and risk management plan has not been developed at this time, however client was given the after-hours number / 911 should there be a change in any of these risk factors.     Appearance:   Appropriate    Eye Contact:   Good    Psychomotor Behavior: Normal    Attitude:   Cooperative    Orientation:   All   Speech    Rate / Production: Normal     Volume:  Normal    Mood:    Normal   Affect:    Appropriate    Thought Content:  Clear    Thought Form:  Coherent  Logical    Insight:    Good      Medication Review:   No current psychiatric medications prescribed     Medication Compliance:   NA     Changes in Health Issues:   Yes: post bariatric surgery; reports his recovery is going well     Chemical Use Review:   Substance Use: Chemical use reviewed, no active concerns identified      Tobacco Use: No current tobacco use.      Diagnosis:  1. Major depressive disorder, single episode in full remission (H)        Collateral Reports Completed:   Not Applicable    PLAN: (Client Tasks / Therapist Tasks / Other)  Continue to follow post-surgical lifestyle guidelines. Contact your team with questions. No additional visits are scheduled at this time, but I remain a resource in the future if needed.        Paola Shipman PsyD LP

## 2019-06-25 ENCOUNTER — OFFICE VISIT (OUTPATIENT)
Dept: SURGERY | Facility: CLINIC | Age: 34
End: 2019-06-25
Payer: COMMERCIAL

## 2019-06-25 ENCOUNTER — HOSPITAL ENCOUNTER (OUTPATIENT)
Dept: LAB | Facility: CLINIC | Age: 34
Discharge: HOME | End: 2019-06-25
Attending: PHYSICIAN ASSISTANT | Admitting: PHYSICIAN ASSISTANT
Payer: COMMERCIAL

## 2019-06-25 VITALS
HEART RATE: 80 BPM | DIASTOLIC BLOOD PRESSURE: 67 MMHG | HEIGHT: 75 IN | BODY MASS INDEX: 35.37 KG/M2 | OXYGEN SATURATION: 97 % | WEIGHT: 284.5 LBS | SYSTOLIC BLOOD PRESSURE: 106 MMHG

## 2019-06-25 DIAGNOSIS — R73.03 PREDIABETES: ICD-10-CM

## 2019-06-25 DIAGNOSIS — K91.2 POSTSURGICAL MALABSORPTION: ICD-10-CM

## 2019-06-25 DIAGNOSIS — Z98.84 BARIATRIC SURGERY STATUS: ICD-10-CM

## 2019-06-25 DIAGNOSIS — G47.33 OSA ON CPAP: ICD-10-CM

## 2019-06-25 DIAGNOSIS — Z13.228 SCREENING FOR ENDOCRINE, NUTRITIONAL, METABOLIC AND IMMUNITY DISORDER: ICD-10-CM

## 2019-06-25 DIAGNOSIS — E66.01 CLASS 2 SEVERE OBESITY DUE TO EXCESS CALORIES WITH SERIOUS COMORBIDITY AND BODY MASS INDEX (BMI) OF 36.0 TO 36.9 IN ADULT (H): Primary | ICD-10-CM

## 2019-06-25 DIAGNOSIS — E66.01 CLASS 2 SEVERE OBESITY DUE TO EXCESS CALORIES WITH SERIOUS COMORBIDITY AND BODY MASS INDEX (BMI) OF 36.0 TO 36.9 IN ADULT (H): ICD-10-CM

## 2019-06-25 DIAGNOSIS — Z13.29 SCREENING FOR ENDOCRINE, NUTRITIONAL, METABOLIC AND IMMUNITY DISORDER: ICD-10-CM

## 2019-06-25 DIAGNOSIS — Z13.0 SCREENING FOR ENDOCRINE, NUTRITIONAL, METABOLIC AND IMMUNITY DISORDER: ICD-10-CM

## 2019-06-25 DIAGNOSIS — Z13.21 SCREENING FOR ENDOCRINE, NUTRITIONAL, METABOLIC AND IMMUNITY DISORDER: ICD-10-CM

## 2019-06-25 DIAGNOSIS — E66.812 CLASS 2 SEVERE OBESITY DUE TO EXCESS CALORIES WITH SERIOUS COMORBIDITY AND BODY MASS INDEX (BMI) OF 36.0 TO 36.9 IN ADULT (H): ICD-10-CM

## 2019-06-25 DIAGNOSIS — E66.812 CLASS 2 SEVERE OBESITY DUE TO EXCESS CALORIES WITH SERIOUS COMORBIDITY AND BODY MASS INDEX (BMI) OF 36.0 TO 36.9 IN ADULT (H): Primary | ICD-10-CM

## 2019-06-25 LAB
DEPRECATED CALCIDIOL+CALCIFEROL SERPL-MC: 87 UG/L (ref 20–75)
FERRITIN SERPL-MCNC: 152 NG/ML (ref 26–388)
HBA1C MFR BLD: 5.1 % (ref 0–5.6)
IRON SATN MFR SERPL: 31 % (ref 15–46)
IRON SERPL-MCNC: 81 UG/DL (ref 35–180)
PTH-INTACT SERPL-MCNC: <12 PG/ML (ref 12–64)
TIBC SERPL-MCNC: 259 UG/DL (ref 240–430)
VIT B12 SERPL-MCNC: 1382 PG/ML (ref 193–986)

## 2019-06-25 PROCEDURE — 83540 ASSAY OF IRON: CPT | Performed by: PHYSICIAN ASSISTANT

## 2019-06-25 PROCEDURE — 83036 HEMOGLOBIN GLYCOSYLATED A1C: CPT | Performed by: PHYSICIAN ASSISTANT

## 2019-06-25 PROCEDURE — 82306 VITAMIN D 25 HYDROXY: CPT | Performed by: PHYSICIAN ASSISTANT

## 2019-06-25 PROCEDURE — 82728 ASSAY OF FERRITIN: CPT | Performed by: PHYSICIAN ASSISTANT

## 2019-06-25 PROCEDURE — 82607 VITAMIN B-12: CPT | Performed by: PHYSICIAN ASSISTANT

## 2019-06-25 PROCEDURE — 36415 COLL VENOUS BLD VENIPUNCTURE: CPT | Performed by: PHYSICIAN ASSISTANT

## 2019-06-25 PROCEDURE — 83550 IRON BINDING TEST: CPT | Performed by: PHYSICIAN ASSISTANT

## 2019-06-25 PROCEDURE — 99214 OFFICE O/P EST MOD 30 MIN: CPT | Performed by: PHYSICIAN ASSISTANT

## 2019-06-25 PROCEDURE — 83970 ASSAY OF PARATHORMONE: CPT | Performed by: PHYSICIAN ASSISTANT

## 2019-06-25 ASSESSMENT — MIFFLIN-ST. JEOR: SCORE: 2318.17

## 2019-06-25 NOTE — PROGRESS NOTES
"BARIATRIC FOLLOW UP VISIT     June 25, 2019       HISTORY OF PRESENT ILLNESS: Pt returns today for his follow-up appointment status post laparoscopic gastric sleeve.  Will be moving to [facility] Oct 2019.  Will hopefully be able to complete his first annual appointment here.  Overall doing well.     Initial Weight: 345 lb (156.5 kg)   Current Weight: 284 lb 8 oz (129 kg)  Cumulative weight loss (lbs): 60.5  Last Visits Weight: 301 lb 1.6 oz (136.6 kg)     Patient is taking the following bariatric postoperative vitamins:  1 Complete multivitamins with minerals (at different times than calcium) - Bariatric Advantage vitamin  5000 International Units of Vitamin D daily - Has been on this for a while  1500 mg of Calcium daily in divided doses  1000 mcg of Vitamin B12 sl daily    Pt is exercising by walking in the evening.  Will be moving to [facility] in October 2019.  So has been doing a lot of packing in preparation for this.        OBESITY RELATED CONDITIONS:  LIZANDRO - still using CPAP  Low back pain - improved  Prediabetes - A1C = 5.8 on 7/31/18. Has not been repeated since.      SOCIAL HISTORY:  Pt denies smoking.  Pt denies alcohol use.  Avoids NSAIDS.  No caffeine      REVIEW OF SYSTEMS:     GI:  Nausea- No  Vomiting- No  Diarrhea- No  Constipation- No    Gets 60+ oz daily  Dysphagia- No  Abdominal Pain- No  Heartburn- No   Finished zantac.       SKIN:  Intertriginous irritation-No  Hair loss-No       PSYCH:  Depression- No  Anxiety- No      LABS/IMAGING/MEDICAL RECORDS REVIEW: last set of labs    PHYSICAL EXAMINATION:   /67 (BP Location: Left arm, Patient Position: Sitting, Cuff Size: Adult Large)   Pulse 80   Ht 6' 2.5\" (1.892 m)   Wt 284 lb 8 oz (129 kg)   SpO2 97%   BMI 36.04 kg/m      GENERAL: Alert and oriented x3. NAD  HEART: No murmurs, rubs or gallops, Regular rate and rhythm  LUNGS: Breathing unlabored, Lung sounds clear to auscultation bilaterally  ABDOMEN: soft; nontender; nondistended, incision well " healed. No hernia  EXTREMITIES: No LE edema bilaterally, Gait normal  SKIN: No intertriginous irritation or rash      ASSESSMENT AND PLAN:      3 months status laparoscopic gastric sleeve  Morbid Obesity - Resolved  Obesity- Body mass index is 36.04 kg/m ..  Post surgical malabsorption:   Ordered vitamin B12, vitamin D, PTH, ferritin, TIBC, and iron labs.   Follow food plan per dietitian recommendations.   Continue taking recommended post-op vitamins.  LIZANDRO - will repeat sleep study at 6 or 12 month visit  Prediabetes - will repeat hemoglobin A1C  Return to clinic in 3 months.      I spent a total of 20 minutes face to face with Aguilar during today's office visit. Over 50% of this time was spent counseling the patient and/or coordinating care.

## 2019-06-25 NOTE — PATIENT INSTRUCTIONS
3 months status laparoscopic gastric sleeve  Morbid Obesity - Resolved  Obesity- Body mass index is 36.04 kg/m ..  Post surgical malabsorption:   Ordered vitamin B12, vitamin D, PTH, ferritin, TIBC, and iron labs.   Follow food plan per dietitian recommendations.   Continue taking recommended post-op vitamins.  LIZANDRO - will repeat sleep study at 6 or 12 month visit  Prediabetes - will repeat hemoglobin A1C  Return to clinic in 3 months.

## 2019-06-26 ENCOUNTER — OFFICE VISIT (OUTPATIENT)
Dept: SURGERY | Facility: CLINIC | Age: 34
End: 2019-06-26
Payer: COMMERCIAL

## 2019-06-26 VITALS — WEIGHT: 284.5 LBS | BODY MASS INDEX: 35.37 KG/M2 | HEIGHT: 75 IN

## 2019-06-26 DIAGNOSIS — E66.01 MORBID OBESITY (H): ICD-10-CM

## 2019-06-26 PROCEDURE — 97803 MED NUTRITION INDIV SUBSEQ: CPT | Performed by: DIETITIAN, REGISTERED

## 2019-06-26 ASSESSMENT — MIFFLIN-ST. JEOR: SCORE: 2318.17

## 2019-06-26 NOTE — PROGRESS NOTES
"NUTRITION POST OP APPOINTMENT  DATE OF VISIT: June 26, 2019    Aguilar Renteria  1985  male  [id number]  33 year old     ASSESSMENT:    REASON FOR VISIT:  Aguilar is a 33 year old year old male presents today for 3 month PO nutrition follow-up appointment. Patient is accompanied by self.    DIAGNOSIS:  Status post gastric sleeve surgery.  Obesity Obesity Grade II BMI 35-39.9     ANTHROPOMETRICS:  Initial Weight:    Height: 189.2 cm (6' 2.5\")  Current Weight: 129 kg (284 lb 8 oz)   BMI: 36.04 kg/(m^2).    VITAMINS AND MINERALS:  1 Multivitamin with Minerals (Bariatric Slow Release)  500 mg Calcium With Vitamin D BID-TID  5000 International units Vitamin D  1000 mcg Vitamin B-12 sublingual  No iron needed per clinic guidelines    NUTRITION HISTORY:  Breakfast: Yogurt + protein granola  Lunch: turkey + beans  hot dog  salad   Supper: (same as lunch and breakfast)   Snacks: rare; 2-3 tortilla chips  Fluids consumed: Water/enhanced water, protein shake (1-2x/day), milk  Consuming liquid calories: Yes  Protein intake: 60-90 grams/day  Tolerate regular texture food: Yes  Any foods not tolerated details: Yes  If any food not tolerated: milkshake, bread, pasta, hot dogs  Portion size: 1 cup  Take 20-30 minutes to consume each meal: Yes/Usually   Eat protein foods first: Yes  Fluids and meals separate by at least 30 minutes: Yes/Usually  Chew foods thoroughly: Yes/Usually  Tolerating diet: Yes  Drinking high protein supplements: Yes  Consuming snacks per day: rare  Additional Information: Pt feeling well and tolerating most foods. Shares that he has started to struggle with acne with post-op increase in dairy (had same issues as a teenager) so is exploring vegetarian forms of protein supplementation. Also reduced calcium supplements for the same reason; discouraged this and encouraged take calcium per guidelines. Pt moving to [facility] in October.       PHYSICAL ACTIVITY:  Type: walking, stairs, packing up home to " move  Frequency (days per week): 4  Duration (min): 30-60    DIAGNOSIS:  Previous Nutrition Diagnosis: Altered gastrointestinal function related to alteration in gastrointestinal structure as evidenced by history of gastric sleeve surgery.- no change    Previous goals:  Start soft diet at day 28 post op. - met  Continue MVI, calcium with vitamin D, vitamin B12, vitamin D - met  Start Vitamin B Complex - not met  Aim for 60 to 90 grams protein. - met  Continue 48 to 64 oz of fluid per day. - met    Current Nutrition Diagnosis: Altered gastrointestinal function related to alteration in gastrointestinal structure as evidenced by history of gastric sleeve surgery.    INTERVENTION:   Nutrition Prescription: Eat 3 meals a day at regular intervals. Consume 60-90 grams of protein daily. Follow post-surgical vitamin and mineral protocol.  Assessed learning needs and learning preferences.    GOALS:  Start Vitamin B Complex  Take calcium per guidelines  Have 3 food groups per meal    Follow-Up:   Recommend standard post-op follow up visits to assist with lifestyle changes or per insurance.  Implementation: Discussed progress toward previous goals; reinforced importance of following bariatric lifestyle changes.    NUTRITION MONITORING AND EVALUATION:  Anticipated compliance: good  Verbalized good understanding.    Follow up: Patient to follow up in 3 months, at 6 months post-op    TIME SPENT WITH PATIENT:  20 minutes    Raina Arroyo RD, LD  Clinical Dietitian

## 2019-09-25 ENCOUNTER — OFFICE VISIT (OUTPATIENT)
Dept: SURGERY | Facility: CLINIC | Age: 34
End: 2019-09-25

## 2019-09-25 ENCOUNTER — HOSPITAL ENCOUNTER (OUTPATIENT)
Dept: LAB | Facility: CLINIC | Age: 34
Discharge: HOME | End: 2019-09-25
Attending: PHYSICIAN ASSISTANT | Admitting: PHYSICIAN ASSISTANT

## 2019-09-25 VITALS
HEIGHT: 75 IN | BODY MASS INDEX: 31.68 KG/M2 | OXYGEN SATURATION: 100 % | HEART RATE: 80 BPM | DIASTOLIC BLOOD PRESSURE: 72 MMHG | WEIGHT: 254.8 LBS | SYSTOLIC BLOOD PRESSURE: 110 MMHG

## 2019-09-25 VITALS — HEIGHT: 75 IN | WEIGHT: 254.85 LBS | BODY MASS INDEX: 31.69 KG/M2

## 2019-09-25 DIAGNOSIS — G47.33 OSA ON CPAP: ICD-10-CM

## 2019-09-25 DIAGNOSIS — K91.2 POSTSURGICAL MALABSORPTION: ICD-10-CM

## 2019-09-25 DIAGNOSIS — Z98.84 BARIATRIC SURGERY STATUS: ICD-10-CM

## 2019-09-25 DIAGNOSIS — E66.09 CLASS 1 OBESITY DUE TO EXCESS CALORIES WITH SERIOUS COMORBIDITY AND BODY MASS INDEX (BMI) OF 32.0 TO 32.9 IN ADULT: ICD-10-CM

## 2019-09-25 DIAGNOSIS — Z98.84 BARIATRIC SURGERY STATUS: Primary | ICD-10-CM

## 2019-09-25 DIAGNOSIS — R12 HEARTBURN: ICD-10-CM

## 2019-09-25 DIAGNOSIS — E66.811 CLASS 1 OBESITY DUE TO EXCESS CALORIES WITH SERIOUS COMORBIDITY AND BODY MASS INDEX (BMI) OF 32.0 TO 32.9 IN ADULT: ICD-10-CM

## 2019-09-25 LAB
DEPRECATED CALCIDIOL+CALCIFEROL SERPL-MC: 62 UG/L (ref 20–75)
ERYTHROCYTE [DISTWIDTH] IN BLOOD BY AUTOMATED COUNT: 13.1 % (ref 10–15)
FERRITIN SERPL-MCNC: 239 NG/ML (ref 26–388)
HCT VFR BLD AUTO: 48.9 % (ref 40–53)
HGB BLD-MCNC: 16.7 G/DL (ref 13.3–17.7)
IRON SATN MFR SERPL: 37 % (ref 15–46)
IRON SERPL-MCNC: 104 UG/DL (ref 35–180)
MCH RBC QN AUTO: 31.1 PG (ref 26.5–33)
MCHC RBC AUTO-ENTMCNC: 34.2 G/DL (ref 31.5–36.5)
MCV RBC AUTO: 91 FL (ref 78–100)
PLATELET # BLD AUTO: 192 10E9/L (ref 150–450)
PTH-INTACT SERPL-MCNC: 18 PG/ML (ref 12–64)
RBC # BLD AUTO: 5.37 10E12/L (ref 4.4–5.9)
TIBC SERPL-MCNC: 279 UG/DL (ref 240–430)
VIT B12 SERPL-MCNC: 787 PG/ML (ref 193–986)
WBC # BLD AUTO: 5.8 10E9/L (ref 4–11)

## 2019-09-25 PROCEDURE — 82607 VITAMIN B-12: CPT | Performed by: PHYSICIAN ASSISTANT

## 2019-09-25 PROCEDURE — 83970 ASSAY OF PARATHORMONE: CPT | Performed by: PHYSICIAN ASSISTANT

## 2019-09-25 PROCEDURE — 83540 ASSAY OF IRON: CPT | Performed by: PHYSICIAN ASSISTANT

## 2019-09-25 PROCEDURE — 36415 COLL VENOUS BLD VENIPUNCTURE: CPT | Performed by: PHYSICIAN ASSISTANT

## 2019-09-25 PROCEDURE — 97803 MED NUTRITION INDIV SUBSEQ: CPT | Performed by: DIETITIAN, REGISTERED

## 2019-09-25 PROCEDURE — 82306 VITAMIN D 25 HYDROXY: CPT | Performed by: PHYSICIAN ASSISTANT

## 2019-09-25 PROCEDURE — 82728 ASSAY OF FERRITIN: CPT | Performed by: PHYSICIAN ASSISTANT

## 2019-09-25 PROCEDURE — 83550 IRON BINDING TEST: CPT | Performed by: PHYSICIAN ASSISTANT

## 2019-09-25 PROCEDURE — 85027 COMPLETE CBC AUTOMATED: CPT | Performed by: PHYSICIAN ASSISTANT

## 2019-09-25 PROCEDURE — 99214 OFFICE O/P EST MOD 30 MIN: CPT | Performed by: PHYSICIAN ASSISTANT

## 2019-09-25 ASSESSMENT — MIFFLIN-ST. JEOR
SCORE: 2183.46
SCORE: 2183.69

## 2019-09-25 NOTE — PATIENT INSTRUCTIONS
6 months status laparoscopic gastric sleeve  Morbid Obesity - Resolved  Obesity - Body mass index is 32.28 kg/m ..  Post surgical malabsorption:   Ordered CBC, vitamin B12, vitamin D, PTH, ferritin, TIBC, and iron labs.   Follow food plan per dietitian recommendations.   Continue taking recommended post-op vitamins. -LIZANDRO - continue with CPAP  Heartburn - prn zantac. Let clinic know if worse  Discussed focusing on diet as he is moving to [facility].  He is looking at clinics there. Let him know he can MyChart this clinic and he will be following up here  Return to clinic in next summer when back from [facility].

## 2019-09-25 NOTE — PROGRESS NOTES
"NUTRITION POST OP APPOINTMENT  DATE OF VISIT: September 25, 2019    Aguilar Renteria  1985  male  [id number]  33 year old     ASSESSMENT:    REASON FOR VISIT:  Aguilar is a 33 year old year old male presents today for 6 month PO nutrition follow-up appointment. Patient is accompanied by self.    DIAGNOSIS:  Status post gastric sleeve surgery.  Obesity Obesity Grade I BMI 30-34.9     ANTHROPOMETRICS:  Initial Weight:  345 lb  Height:  6'2.5\"  Current Weight:254.32 lb    BMI: 32.28  kg/(m^2).    VITAMINS AND MINERALS:  1 Multivitamin with Minerals (Solo by [facility])  stopped Calcium With Vitamin D ~ 1 month ago (was doing 500 mg TID)  Stopped vitamin D and vitamin B-12 based on labs      NUTRITION HISTORY:  Breakfast: Greek yogurt sometimes high protein granola  Lunch: lettuce wrap with roast beef, tomatoes, cheese, light mckeon. Or mixed green salad + veggies+ sometimes chicken  Supper: chicken, bell pepper, corn tortilla, grated cheese  Snacks: occasional pretzel sticks or banana  Fluids consumed: Water, unsweetened tea, Vitamin water  Consuming liquid calories: No  Protein intake: 50-60 grams/day  Tolerate regular texture food: Yes  Any foods not tolerated details: Yes  If any food not tolerated: bread  Portion size: 1 cup  Take 20-30 minutes to consume each meal: Yes   Eat protein foods first: No  Fluids and meals separate by at least 30 minutes: Yes  Chew foods thoroughly: Yes  Tolerating diet: Yes  Drinking high protein supplements: No  Consuming snacks per day: 0-1  Additional Information: Has had a busy 1-2 months getting ready to relocate. Reviewed vitamin bottles with pt on his phone. Moving to [facility] in ~ 2 weeks, but will return for annual visit in June. Pt did not realized that tea has caffeine. RD answered questions about  foods and how they may be tolerated.        PHYSICAL ACTIVITY:  Type: none intentional    DIAGNOSIS:  Previous Nutrition Diagnosis: Altered gastrointestinal function " related to alteration in gastrointestinal structure as evidenced by history of gastric sleeve surgery.- no change    Previous goals:  Start Vitamin B Complex-met (in Solo)  Take calcium per guidelines-not met  Have 3 food groups per meal-improving    Current Nutrition Diagnosis: Altered gastrointestinal function related to alteration in gastrointestinal structure as evidenced by history of gastric sleeve surgery.    INTERVENTION:   Nutrition Prescription: Eat 3 meals a day at regular intervals. Consume 60-90 grams of protein daily. Follow post-surgical vitamin and mineral protocol.  Assessed learning needs and learning preferences.    GOALS:  Switch to 600 mg calcium citrate tablet BID (offered suggestions for products)  Switch to decaf or herbal tea  Aim for 60-90 g protein per day  Transition to an alternative multivitamin/ mineral (offered suggestions)    Implementation: Discussed progress toward previous goals; reinforced importance of following bariatric lifestyle changes.    NUTRITION MONITORING AND EVALUATION:  Anticipated compliance: good  Verbalized good understanding.    Follow up: Patient to follow up in 6 months.    TIME SPENT WITH PATIENT:  25 minutes    Foreign Kuhn RD, LD  [facility]  [phone]

## 2019-09-25 NOTE — PROGRESS NOTES
"BARIATRIC FOLLOW UP VISIT     September 25, 2019       HISTORY OF PRESENT ILLNESS: Pt returns today for his follow-up appointment status post laparoscopic gastric sleeve.  He is planning to move to [facility] next week.  His wife is teaching there. Her contract is for 2 years but patient will be back several times annually.  Life has been chaotic in the past month as he is preparing to go.  Has looked at some bariatric facilities in [place] but will be following back up with this clinic next summer when he is visiting.         BARIATRIC METRICS:  Current Weight: 254 lb 12.8 oz (115.6 kg)  Body mass index is 32.28 kg/m .   Wt change since last visit (lbs): -29.7  Cumulative weight loss (lbs): 90.2       Patient is taking the following bariatric postoperative vitamins:  He has been very inconsistent with vitamins over the past month as he is preparing to leave the country next week  1 Bariatric Solo  No Vitamin D per result from last lab  No Calcium for the past month  Stopped Vitamin B12 monthly  No B Complex    Pt is not exercising but has been very active with moving and packing       OBESITY RELATED CONDITIONS:  LIZANDRO - Uses CPAP nightly.  Tried not using for a few weeks but slept horribly  Weight Bearing Joint Pain - resolved  Prediabetes - resolved  6/2019 A1C=5.1     SOCIAL HISTORY:  Pt denies smoking.  Pt denies alcohol use.  Avoids NSAIDS.  No caffeine    REVIEW OF SYSTEMS:     GI:  Nausea- No  Vomiting- No  Diarrhea- No  Constipation- No  Dysphagia- No  Abdominal Pain- No  Heartburn- sometimes     SKIN:  Intertriginous irritation- No       PSYCH:  Depression- No  Anxiety- No      LABS/IMAGING/MEDICAL RECORDS REVIEW: last set of labs    PHYSICAL EXAMINATION:   /72 (BP Location: Right arm, Patient Position: Sitting, Cuff Size: Adult Large)   Pulse 80   Ht 6' 2.5\" (1.892 m)   Wt 254 lb 12.8 oz (115.6 kg)   SpO2 100%   BMI 32.28 kg/m      GENERAL: Alert and oriented x3. NAD  HEART: No murmurs, rubs or " gallops, Regular rate and rhythm  LUNGS: Breathing unlabored, Lung sounds clear to auscultation bilaterally  ABDOMEN: soft; nontender; nondistended, incision well healed. No hernia  EXTREMITIES: No LE edema bilaterally, Gait normal  SKIN: No intertriginous irritation or rash      ASSESSMENT AND PLAN:      6 months status laparoscopic gastric sleeve  Morbid Obesity - Resolved  Obesity - Body mass index is 32.28 kg/m ..  Post surgical malabsorption:   Ordered CBC, vitamin B12, vitamin D, PTH, ferritin, TIBC, and iron labs.   Follow food plan per dietitian recommendations.   Continue taking recommended post-op vitamins. -WILL LET PATIENT KNOW ABOUT RESTARTING VIT D AND B12  LIZANDRO - continue with CPAP  Heartburn - prn zantac. Let clinic know if worse  Discussed focusing on diet as he is moving to [facility].  He is looking at clinics there. Let him know he can MyChart this clinic and he will be following up here  Return to clinic in next summer when back from [facility].      I spent a total of 30 minutes face to face with Aguilar during today's office visit. Over 50% of this time was spent counseling the patient and/or coordinating care.

## 2020-03-02 ENCOUNTER — HEALTH MAINTENANCE LETTER (OUTPATIENT)
Age: 35
End: 2020-03-02

## 2020-03-05 ENCOUNTER — MYC MEDICAL ADVICE (OUTPATIENT)
Dept: SURGERY | Facility: CLINIC | Age: 35
End: 2020-03-05

## 2020-12-20 ENCOUNTER — HEALTH MAINTENANCE LETTER (OUTPATIENT)
Age: 35
End: 2020-12-20

## 2021-04-24 ENCOUNTER — HEALTH MAINTENANCE LETTER (OUTPATIENT)
Age: 36
End: 2021-04-24

## 2021-10-03 ENCOUNTER — HEALTH MAINTENANCE LETTER (OUTPATIENT)
Age: 36
End: 2021-10-03

## 2021-10-21 ENCOUNTER — TRANSFERRED RECORDS (OUTPATIENT)
Dept: HEALTH INFORMATION MANAGEMENT | Facility: CLINIC | Age: 36
End: 2021-10-21
Payer: COMMERCIAL

## 2021-12-15 ENCOUNTER — PRE VISIT (OUTPATIENT)
Dept: UROLOGY | Facility: CLINIC | Age: 36
End: 2021-12-15
Payer: COMMERCIAL

## 2021-12-16 NOTE — TELEPHONE ENCOUNTER
MEDICAL RECORDS REQUEST   [facility]  Urology Clinic  [address] [place] [address]  PHONE: [phone]  Fax: [phone]        FUTURE VISIT INFORMATION                                                   Aguilar Renteria, : 1985 scheduled for future visit at [facility]    APPOINTMENT INFORMATION:    Date: 3/4/2022    Provider:  Joe Lara MD    Reason for Visit/Diagnosis: Infertility    REFERRAL INFORMATION:    Referring provider:  N/A    Specialty: N/A    Referring providers clinic:  N/A    Clinic contact number:  N/A    RECORDS REQUESTED FOR VISIT                                                     NOTES  STATUS/DETAILS   OFFICE NOTE from referring provider  no   OFFICE NOTE from other specialist  no   DISCHARGE SUMMARY from hospital  no   DISCHARGE REPORT from the ER  no   OPERATIVE REPORT  no   MEDICATION LIST  yes   LABS     URINALYSIS (UA)  no   URINE CYTOLOGY  no   INFERTILITY     ALBUMIN  no   FSH  no   LAST UROLOGY/OB GYN VISIT NOTE  no   LH  no   SEMEN ANALYSIS (LAST 2)  yes   SHBG  no   T  no     PRE-VISIT CHECKLIST      Record collection complete yes   Appointment appropriately scheduled           (right time/right provider) Yes   Joint diagnostic appointment coordinated correctly          (ensure right order & amount of time) Yes   MyChart activation Yes   Questionnaire complete If no, please explain pending

## 2021-12-27 ENCOUNTER — PRE VISIT (OUTPATIENT)
Dept: UROLOGY | Facility: CLINIC | Age: 36
End: 2021-12-27
Payer: COMMERCIAL

## 2021-12-27 NOTE — TELEPHONE ENCOUNTER
Reason for visit: Consult     Relevant information: infertility    Records/imaging/labs/orders: in Epic; SA faxed    Pt called: no    At Rooming: normal

## 2022-02-08 ENCOUNTER — PRE VISIT (OUTPATIENT)
Dept: UROLOGY | Facility: CLINIC | Age: 37
End: 2022-02-08
Payer: COMMERCIAL

## 2022-02-11 ENCOUNTER — TRANSFERRED RECORDS (OUTPATIENT)
Dept: HEALTH INFORMATION MANAGEMENT | Facility: CLINIC | Age: 37
End: 2022-02-11
Payer: COMMERCIAL

## 2022-05-15 ENCOUNTER — HEALTH MAINTENANCE LETTER (OUTPATIENT)
Age: 37
End: 2022-05-15

## 2022-09-10 ENCOUNTER — HEALTH MAINTENANCE LETTER (OUTPATIENT)
Age: 37
End: 2022-09-10

## 2023-06-03 ENCOUNTER — HEALTH MAINTENANCE LETTER (OUTPATIENT)
Age: 38
End: 2023-06-03

## (undated) DEVICE — SOL WATER IRRIG 1000ML BOTTLE 2F7114

## (undated) DEVICE — Device

## (undated) DEVICE — TUBING C02 INSUFFLATION LAP FILTER HEATER 6198

## (undated) DEVICE — ENDO TROCAR FIRST ENTRY KII FIOS Z-THRD 12X100MM CTF73

## (undated) DEVICE — ESU GROUND PAD UNIVERSAL W/O CORD

## (undated) DEVICE — SU VICRYL 0 TIE 12X18" J906G

## (undated) DEVICE — BNDG ABDOMINAL BINDER 9X62-84" 79-89210

## (undated) DEVICE — ESU HOLDER LAP INST DISP PURPLE LONG 330MM H-PRO-330

## (undated) DEVICE — PREP CHLORAPREP 26ML TINTED ORANGE  260815

## (undated) DEVICE — SUCTION CANISTER MEDIVAC LINER 3000ML W/LID 65651-530

## (undated) DEVICE — SUCTION IRR STRYKERFLOW II W/TIP 250-070-520

## (undated) DEVICE — ENDO TROCAR FIRST ENTRY KII FIOS Z-THRD 05X100MM CTF03

## (undated) DEVICE — SOL NACL 0.9% IRRIG 3000ML BAG 2B7477

## (undated) DEVICE — GLOVE PROTEXIS MICRO 7.5  2D73PM75

## (undated) DEVICE — LINEN TOWEL PACK X5 5464

## (undated) DEVICE — DRAPE BREAST/CHEST 29420

## (undated) DEVICE — DRSG BANDAID 3/4X3"

## (undated) DEVICE — GASTRECTOMY SLEEVE STABILIZATION SYSTEM VISIGI 3D 36FR 5236B

## (undated) DEVICE — ESU LIGASURE SEALER/DIVIDER RETRACT L-HOOK 37CM LF5637

## (undated) DEVICE — PACK LAP CHOLE SLC15LCFSD

## (undated) DEVICE — SYR 50ML CATH TIP W/O NDL 309620

## (undated) DEVICE — APPLICATOR EVICEL RIGID TIP 35CM 3908

## (undated) DEVICE — SU MONOCRYL 4-0 PS-2 18" UND Y496G

## (undated) DEVICE — ENDO TROCAR SLEEVE KII Z-THREADED 12X100MM CTS22

## (undated) DEVICE — STPL ENDO RELOAD SIG GIA REINFORCED 60MM MED SIGTRS60AMT

## (undated) DEVICE — SOL NACL 0.9% IRRIG 1000ML BOTTLE 07138-09

## (undated) DEVICE — DRSG GAUZE 4X4" 3033

## (undated) DEVICE — DRAPE LEGGINGS 8421

## (undated) DEVICE — GLOVE PROTEXIS BLUE W/NEU-THERA 7.5  2D73EB75

## (undated) DEVICE — BLADE CLIPPER 4406

## (undated) DEVICE — ENDO TROCAR OPTICAL ACCESS KII Z-THRD 15X100MM C0R37

## (undated) DEVICE — STPL ENDO RELOAD SIG GIA REINFORCED 60MM X-TRA SIGTRS60AXT

## (undated) DEVICE — CLIP APPLIER ENDO ROTATING 10MM MED/LG ER320

## (undated) RX ORDER — VECURONIUM BROMIDE 1 MG/ML
INJECTION, POWDER, LYOPHILIZED, FOR SOLUTION INTRAVENOUS
Status: DISPENSED
Start: 2019-03-26

## (undated) RX ORDER — DEXAMETHASONE SODIUM PHOSPHATE 4 MG/ML
INJECTION, SOLUTION INTRA-ARTICULAR; INTRALESIONAL; INTRAMUSCULAR; INTRAVENOUS; SOFT TISSUE
Status: DISPENSED
Start: 2019-03-26

## (undated) RX ORDER — CEFAZOLIN SODIUM IN 0.9 % NACL 3 G/100 ML
INTRAVENOUS SOLUTION, PIGGYBACK (ML) INTRAVENOUS
Status: DISPENSED
Start: 2019-03-26

## (undated) RX ORDER — NEOSTIGMINE METHYLSULFATE 1 MG/ML
VIAL (ML) INJECTION
Status: DISPENSED
Start: 2019-03-26

## (undated) RX ORDER — GLYCOPYRROLATE 0.2 MG/ML
INJECTION, SOLUTION INTRAMUSCULAR; INTRAVENOUS
Status: DISPENSED
Start: 2019-03-26

## (undated) RX ORDER — ONDANSETRON 2 MG/ML
INJECTION INTRAMUSCULAR; INTRAVENOUS
Status: DISPENSED
Start: 2019-03-26

## (undated) RX ORDER — BUPIVACAINE HYDROCHLORIDE AND EPINEPHRINE 2.5; 5 MG/ML; UG/ML
INJECTION, SOLUTION EPIDURAL; INFILTRATION; INTRACAUDAL; PERINEURAL
Status: DISPENSED
Start: 2019-03-26

## (undated) RX ORDER — FENTANYL CITRATE 50 UG/ML
INJECTION, SOLUTION INTRAMUSCULAR; INTRAVENOUS
Status: DISPENSED
Start: 2019-03-26

## (undated) RX ORDER — HYDROMORPHONE HYDROCHLORIDE 1 MG/ML
INJECTION, SOLUTION INTRAMUSCULAR; INTRAVENOUS; SUBCUTANEOUS
Status: DISPENSED
Start: 2019-03-26

## (undated) RX ORDER — HEPARIN SODIUM 5000 [USP'U]/.5ML
INJECTION, SOLUTION INTRAVENOUS; SUBCUTANEOUS
Status: DISPENSED
Start: 2019-03-26

## (undated) RX ORDER — LIDOCAINE HYDROCHLORIDE 20 MG/ML
INJECTION, SOLUTION EPIDURAL; INFILTRATION; INTRACAUDAL; PERINEURAL
Status: DISPENSED
Start: 2019-03-26

## (undated) RX ORDER — LIDOCAINE HYDROCHLORIDE 10 MG/ML
INJECTION, SOLUTION INFILTRATION; PERINEURAL
Status: DISPENSED
Start: 2019-03-26

## (undated) RX ORDER — ACYCLOVIR 200 MG/1
CAPSULE ORAL
Status: DISPENSED
Start: 2019-03-26